# Patient Record
Sex: MALE | Race: WHITE | NOT HISPANIC OR LATINO | Employment: OTHER | URBAN - METROPOLITAN AREA
[De-identification: names, ages, dates, MRNs, and addresses within clinical notes are randomized per-mention and may not be internally consistent; named-entity substitution may affect disease eponyms.]

---

## 2023-03-01 LAB
ANION GAP IN SER/PLAS: 8 MMOL/L (ref 10–20)
CALCIUM (MG/DL) IN SER/PLAS: 7.8 MG/DL (ref 8.6–10.3)
CARBON DIOXIDE, TOTAL (MMOL/L) IN SER/PLAS: 31 MMOL/L (ref 21–32)
CHLORIDE (MMOL/L) IN SER/PLAS: 103 MMOL/L (ref 98–107)
CREATININE (MG/DL) IN SER/PLAS: 0.66 MG/DL (ref 0.5–1.3)
ERYTHROCYTE DISTRIBUTION WIDTH (RATIO) BY AUTOMATED COUNT: 14.6 % (ref 11.5–14.5)
ERYTHROCYTE MEAN CORPUSCULAR HEMOGLOBIN CONCENTRATION (G/DL) BY AUTOMATED: 33.7 G/DL (ref 32–36)
ERYTHROCYTE MEAN CORPUSCULAR VOLUME (FL) BY AUTOMATED COUNT: 94 FL (ref 80–100)
ERYTHROCYTES (10*6/UL) IN BLOOD BY AUTOMATED COUNT: 3.77 X10E12/L (ref 4.5–5.9)
GFR MALE: >90 ML/MIN/1.73M2
GLUCOSE (MG/DL) IN SER/PLAS: 87 MG/DL (ref 74–99)
HEMATOCRIT (%) IN BLOOD BY AUTOMATED COUNT: 35.3 % (ref 41–52)
HEMOGLOBIN (G/DL) IN BLOOD: 11.9 G/DL (ref 13.5–17.5)
LEUKOCYTES (10*3/UL) IN BLOOD BY AUTOMATED COUNT: 4.7 X10E9/L (ref 4.4–11.3)
PLATELETS (10*3/UL) IN BLOOD AUTOMATED COUNT: 136 X10E9/L (ref 150–450)
POTASSIUM (MMOL/L) IN SER/PLAS: 4.4 MMOL/L (ref 3.5–5.3)
SODIUM (MMOL/L) IN SER/PLAS: 138 MMOL/L (ref 136–145)
UREA NITROGEN (MG/DL) IN SER/PLAS: 12 MG/DL (ref 6–23)

## 2023-03-03 PROBLEM — F33.9 MAJOR DEPRESSION, RECURRENT (CMS-HCC): Status: ACTIVE | Noted: 2023-03-03

## 2023-03-03 PROBLEM — K90.89 BILE SALT-INDUCED DIARRHEA (HHS-HCC): Status: ACTIVE | Noted: 2023-03-03

## 2023-03-03 PROBLEM — M15.9 GENERALIZED OSTEOARTHRITIS OF MULTIPLE SITES: Status: ACTIVE | Noted: 2023-03-03

## 2023-03-03 PROBLEM — Z96.659 HISTORY OF KNEE REPLACEMENT: Status: ACTIVE | Noted: 2023-03-03

## 2023-03-03 PROBLEM — E66.01 MORBID OBESITY (MULTI): Status: ACTIVE | Noted: 2023-03-03

## 2023-03-03 PROBLEM — M48.061 LUMBAR STENOSIS: Status: ACTIVE | Noted: 2023-03-03

## 2023-03-03 PROBLEM — I10 BENIGN HYPERTENSION: Status: ACTIVE | Noted: 2023-03-03

## 2023-03-03 PROBLEM — L20.9 ATOPIC DERMATITIS: Status: ACTIVE | Noted: 2023-03-03

## 2023-03-03 PROBLEM — G47.33 OBSTRUCTIVE SLEEP APNEA: Status: ACTIVE | Noted: 2023-03-03

## 2023-03-03 PROBLEM — E03.9 ADULT HYPOTHYROIDISM: Status: ACTIVE | Noted: 2023-03-03

## 2023-03-03 PROBLEM — Z98.890 H/O COLONOSCOPY WITH POLYPECTOMY: Status: ACTIVE | Noted: 2023-03-03

## 2023-03-03 PROBLEM — M17.12 ARTHRITIS OF KNEE, LEFT: Status: ACTIVE | Noted: 2023-03-03

## 2023-03-03 PROBLEM — R10.30 LOWER ABDOMINAL PAIN: Status: ACTIVE | Noted: 2023-03-03

## 2023-03-03 PROBLEM — Z86.0100 H/O COLONOSCOPY WITH POLYPECTOMY: Status: ACTIVE | Noted: 2023-03-03

## 2023-03-03 PROBLEM — D69.6 THROMBOCYTOPENIA (CMS-HCC): Status: ACTIVE | Noted: 2023-03-03

## 2023-03-03 PROBLEM — M47.816 SPONDYLOSIS OF LUMBAR REGION WITHOUT MYELOPATHY OR RADICULOPATHY: Status: ACTIVE | Noted: 2023-03-03

## 2023-03-03 PROBLEM — I95.1 ORTHOSTATIC HYPOTENSION: Status: ACTIVE | Noted: 2023-03-03

## 2023-03-03 PROBLEM — M25.561 RIGHT KNEE PAIN: Status: ACTIVE | Noted: 2023-03-03

## 2023-03-03 PROBLEM — N40.0 BPH (BENIGN PROSTATIC HYPERPLASIA): Status: ACTIVE | Noted: 2023-03-03

## 2023-03-03 PROBLEM — R74.8 ELEVATED LIVER ENZYMES: Status: ACTIVE | Noted: 2023-03-03

## 2023-03-03 PROBLEM — R60.0 EDEMA LEG: Status: ACTIVE | Noted: 2023-03-03

## 2023-03-03 PROBLEM — I48.91 ATRIAL FIBRILLATION (MULTI): Status: ACTIVE | Noted: 2023-03-03

## 2023-03-03 PROBLEM — I87.393 CHRONIC VENOUS HYPERTENSION (IDIOPATHIC) WITH OTHER COMPLICATIONS OF BILATERAL LOWER EXTREMITY: Status: ACTIVE | Noted: 2023-03-03

## 2023-03-03 PROBLEM — I89.0 LYMPHEDEMA: Status: ACTIVE | Noted: 2023-03-03

## 2023-03-03 PROBLEM — R53.1 WEAKNESS GENERALIZED: Status: ACTIVE | Noted: 2023-03-03

## 2023-03-03 PROBLEM — K40.90 INGUINAL HERNIA: Status: ACTIVE | Noted: 2023-03-03

## 2023-03-03 PROBLEM — D47.3 ESSENTIAL THROMBOCYTHEMIA (MULTI): Status: ACTIVE | Noted: 2023-03-03

## 2023-03-03 PROBLEM — I25.10 CAD (CORONARY ARTERY DISEASE): Status: ACTIVE | Noted: 2023-03-03

## 2023-03-03 PROBLEM — Z86.010 H/O COLONOSCOPY WITH POLYPECTOMY: Status: ACTIVE | Noted: 2023-03-03

## 2023-03-03 PROBLEM — M16.11 PRIMARY LOCALIZED OSTEOARTHROSIS OF RIGHT HIP: Status: ACTIVE | Noted: 2023-03-03

## 2023-03-03 PROBLEM — M17.11 ARTHRITIS OF KNEE, RIGHT: Status: ACTIVE | Noted: 2023-03-03

## 2023-03-03 RX ORDER — NALOXONE HYDROCHLORIDE 4 MG/.1ML
SPRAY NASAL
COMMUNITY

## 2023-03-03 RX ORDER — DOXAZOSIN 1 MG/1
1 TABLET ORAL DAILY PRN
COMMUNITY
Start: 2017-08-01 | End: 2023-08-24

## 2023-03-03 RX ORDER — FINASTERIDE 1 MG/1
1 TABLET, FILM COATED ORAL DAILY
COMMUNITY
Start: 2019-10-14 | End: 2023-05-01

## 2023-03-03 RX ORDER — CALCIUM CITRATE/VITAMIN D3 200MG-6.25
TABLET ORAL
COMMUNITY

## 2023-03-03 RX ORDER — VENLAFAXINE HYDROCHLORIDE 150 MG/1
75 CAPSULE, EXTENDED RELEASE ORAL DAILY
COMMUNITY
Start: 2017-10-16 | End: 2023-04-03

## 2023-03-03 RX ORDER — PANTOPRAZOLE SODIUM 40 MG/1
1 TABLET, DELAYED RELEASE ORAL DAILY
COMMUNITY
Start: 2022-08-31 | End: 2023-03-21

## 2023-03-03 RX ORDER — LEVOTHYROXINE SODIUM 50 UG/1
1.5 TABLET ORAL DAILY
COMMUNITY
Start: 2021-10-04 | End: 2023-09-25

## 2023-03-03 RX ORDER — VIT C/E/ZN/COPPR/LUTEIN/ZEAXAN 250MG-90MG
CAPSULE ORAL
COMMUNITY

## 2023-03-03 RX ORDER — WARFARIN SODIUM 5 MG/1
5 TABLET ORAL
COMMUNITY

## 2023-03-03 RX ORDER — TRIAMCINOLONE ACETONIDE 5 MG/G
OINTMENT TOPICAL
COMMUNITY
Start: 2019-04-15

## 2023-03-03 RX ORDER — LANOLIN ALCOHOL/MO/W.PET/CERES
CREAM (GRAM) TOPICAL
COMMUNITY

## 2023-03-03 RX ORDER — AMIODARONE HYDROCHLORIDE 200 MG/1
0.5 TABLET ORAL EVERY OTHER DAY
COMMUNITY

## 2023-03-03 RX ORDER — IBUPROFEN 100 MG/5ML
SUSPENSION, ORAL (FINAL DOSE FORM) ORAL
COMMUNITY

## 2023-03-07 ENCOUNTER — APPOINTMENT (OUTPATIENT)
Dept: LAB | Facility: LAB | Age: 78
End: 2023-03-07
Payer: MEDICARE

## 2023-03-07 ENCOUNTER — OFFICE VISIT (OUTPATIENT)
Dept: PRIMARY CARE | Facility: CLINIC | Age: 78
End: 2023-03-07
Payer: MEDICARE

## 2023-03-07 VITALS
OXYGEN SATURATION: 98 % | DIASTOLIC BLOOD PRESSURE: 67 MMHG | HEIGHT: 70 IN | BODY MASS INDEX: 38.37 KG/M2 | TEMPERATURE: 98.1 F | WEIGHT: 268 LBS | HEART RATE: 66 BPM | SYSTOLIC BLOOD PRESSURE: 102 MMHG

## 2023-03-07 DIAGNOSIS — E66.01 MORBID OBESITY (MULTI): ICD-10-CM

## 2023-03-07 DIAGNOSIS — F33.0 MILD EPISODE OF RECURRENT MAJOR DEPRESSIVE DISORDER (CMS-HCC): ICD-10-CM

## 2023-03-07 DIAGNOSIS — I25.10 CORONARY ARTERY DISEASE INVOLVING NATIVE HEART WITHOUT ANGINA PECTORIS, UNSPECIFIED VESSEL OR LESION TYPE: Primary | ICD-10-CM

## 2023-03-07 DIAGNOSIS — S06.5X0A TRAUMATIC SUBDURAL HEMORRHAGE WITHOUT LOSS OF CONSCIOUSNESS, INITIAL ENCOUNTER (MULTI): ICD-10-CM

## 2023-03-07 DIAGNOSIS — E03.9 ADULT HYPOTHYROIDISM: ICD-10-CM

## 2023-03-07 DIAGNOSIS — G47.33 OBSTRUCTIVE SLEEP APNEA: ICD-10-CM

## 2023-03-07 DIAGNOSIS — M15.9 GENERALIZED OSTEOARTHRITIS OF MULTIPLE SITES: ICD-10-CM

## 2023-03-07 DIAGNOSIS — I10 BENIGN HYPERTENSION: ICD-10-CM

## 2023-03-07 DIAGNOSIS — S80.12XS HEMATOMA OF LEG, LEFT, SEQUELA: ICD-10-CM

## 2023-03-07 DIAGNOSIS — S81.802D OPEN WOUND OF LEFT LOWER LEG, SUBSEQUENT ENCOUNTER: ICD-10-CM

## 2023-03-07 DIAGNOSIS — I48.11 LONGSTANDING PERSISTENT ATRIAL FIBRILLATION (MULTI): ICD-10-CM

## 2023-03-07 LAB
ALANINE AMINOTRANSFERASE (SGPT) (U/L) IN SER/PLAS: 14 U/L (ref 10–52)
ALBUMIN (G/DL) IN SER/PLAS: 3.8 G/DL (ref 3.4–5)
ALKALINE PHOSPHATASE (U/L) IN SER/PLAS: 55 U/L (ref 33–136)
ANION GAP IN SER/PLAS: 11 MMOL/L (ref 10–20)
ASPARTATE AMINOTRANSFERASE (SGOT) (U/L) IN SER/PLAS: 18 U/L (ref 9–39)
BILIRUBIN TOTAL (MG/DL) IN SER/PLAS: 0.9 MG/DL (ref 0–1.2)
CALCIUM (MG/DL) IN SER/PLAS: 8.5 MG/DL (ref 8.6–10.3)
CARBON DIOXIDE, TOTAL (MMOL/L) IN SER/PLAS: 31 MMOL/L (ref 21–32)
CHLORIDE (MMOL/L) IN SER/PLAS: 98 MMOL/L (ref 98–107)
CREATININE (MG/DL) IN SER/PLAS: 0.77 MG/DL (ref 0.5–1.3)
ERYTHROCYTE DISTRIBUTION WIDTH (RATIO) BY AUTOMATED COUNT: 14 % (ref 11.5–14.5)
ERYTHROCYTE MEAN CORPUSCULAR HEMOGLOBIN CONCENTRATION (G/DL) BY AUTOMATED: 32.9 G/DL (ref 32–36)
ERYTHROCYTE MEAN CORPUSCULAR VOLUME (FL) BY AUTOMATED COUNT: 94 FL (ref 80–100)
ERYTHROCYTES (10*6/UL) IN BLOOD BY AUTOMATED COUNT: 4.18 X10E12/L (ref 4.5–5.9)
GFR MALE: >90 ML/MIN/1.73M2
GLUCOSE (MG/DL) IN SER/PLAS: 89 MG/DL (ref 74–99)
HEMATOCRIT (%) IN BLOOD BY AUTOMATED COUNT: 39.2 % (ref 41–52)
HEMOGLOBIN (G/DL) IN BLOOD: 12.9 G/DL (ref 13.5–17.5)
LEUKOCYTES (10*3/UL) IN BLOOD BY AUTOMATED COUNT: 5.6 X10E9/L (ref 4.4–11.3)
PLATELETS (10*3/UL) IN BLOOD AUTOMATED COUNT: 280 X10E9/L (ref 150–450)
POTASSIUM (MMOL/L) IN SER/PLAS: 4.6 MMOL/L (ref 3.5–5.3)
PROTEIN TOTAL: 6.4 G/DL (ref 6.4–8.2)
SODIUM (MMOL/L) IN SER/PLAS: 135 MMOL/L (ref 136–145)
UREA NITROGEN (MG/DL) IN SER/PLAS: 17 MG/DL (ref 6–23)

## 2023-03-07 PROCEDURE — 1036F TOBACCO NON-USER: CPT | Performed by: FAMILY MEDICINE

## 2023-03-07 PROCEDURE — 3074F SYST BP LT 130 MM HG: CPT | Performed by: FAMILY MEDICINE

## 2023-03-07 PROCEDURE — 3078F DIAST BP <80 MM HG: CPT | Performed by: FAMILY MEDICINE

## 2023-03-07 PROCEDURE — 1159F MED LIST DOCD IN RCRD: CPT | Performed by: FAMILY MEDICINE

## 2023-03-07 PROCEDURE — 85610 PROTHROMBIN TIME: CPT

## 2023-03-07 PROCEDURE — 99496 TRANSJ CARE MGMT HIGH F2F 7D: CPT | Performed by: FAMILY MEDICINE

## 2023-03-07 PROCEDURE — 80053 COMPREHEN METABOLIC PANEL: CPT

## 2023-03-07 PROCEDURE — 36415 COLL VENOUS BLD VENIPUNCTURE: CPT

## 2023-03-07 PROCEDURE — 85027 COMPLETE CBC AUTOMATED: CPT

## 2023-03-07 ASSESSMENT — ENCOUNTER SYMPTOMS
CHEST TIGHTNESS: 0
SHORTNESS OF BREATH: 0
PALPITATIONS: 0

## 2023-03-07 NOTE — PATIENT INSTRUCTIONS
Continue with physical therapy, Occupational Therapy as ordered     Continue with wound therapy     Coumadin: recommend stop for 1-2 weeks to let the wound heal   I will let Dr Moore know     Constipation stool softeners ,  fiber supplements ,  miralax as needed     Tylenol as needed ice as needed, narcotics as needed     Continue with blood thinners, watch for bleeding, bruising    Atrial Fibrillation, coronary artery disease: Continue management per cardiology    Hypothyroidism: Continue current medications atrial fibrillation,
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
## 2023-03-08 ENCOUNTER — TELEPHONE (OUTPATIENT)
Dept: PRIMARY CARE | Facility: CLINIC | Age: 78
End: 2023-03-08
Payer: MEDICARE

## 2023-03-08 NOTE — TELEPHONE ENCOUNTER
Result Communication    Resulted Orders   CBC   Result Value Ref Range    WBC 5.6 4.4 - 11.3 x10E9/L    RBC 4.18 (L) 4.50 - 5.90 x10E12/L    Hemoglobin 12.9 (L) 13.5 - 17.5 g/dL    Hematocrit 39.2 (L) 41.0 - 52.0 %    MCV 94 80 - 100 fL    MCHC 32.9 32.0 - 36.0 g/dL    Platelets 280 150 - 450 x10E9/L    RDW 14.0 11.5 - 14.5 %   Comprehensive Metabolic Panel   Result Value Ref Range    Glucose 89 74 - 99 mg/dL    Sodium 135 (L) 136 - 145 mmol/L    Potassium 4.6 3.5 - 5.3 mmol/L    Chloride 98 98 - 107 mmol/L    Bicarbonate 31 21 - 32 mmol/L    Anion Gap 11 10 - 20 mmol/L    Urea Nitrogen 17 6 - 23 mg/dL    Creatinine 0.77 0.50 - 1.30 mg/dL    GFR MALE >90 >90 mL/min/1.73m2      Comment:       CALCULATIONS OF ESTIMATED GFR ARE PERFORMED   USING THE 2021 CKD-EPI STUDY REFIT EQUATION   WITHOUT THE RACE VARIABLE FOR THE IDMS-TRACEABLE   CREATININE METHODS.    https://jasn.asnjournals.org/content/early/2021/09/22/ASN.8437443688    Calcium 8.5 (L) 8.6 - 10.3 mg/dL    Albumin 3.8 3.4 - 5.0 g/dL    Alkaline Phosphatase 55 33 - 136 U/L    Total Protein 6.4 6.4 - 8.2 g/dL    AST 18 9 - 39 U/L    Total Bilirubin 0.9 0.0 - 1.2 mg/dL    ALT (SGPT) 14 10 - 52 U/L      Comment:       Patients treated with Sulfasalazine may generate    falsely decreased results for ALT.       2:31 PM      LVM.  CA

## 2023-03-08 NOTE — TELEPHONE ENCOUNTER
----- Message from Ruth Lowery MD sent at 3/8/2023  8:07 AM EST -----  Please notify pt of lab results: Generally looks okay mild anemia but stable, electrolytes stable

## 2023-03-09 ENCOUNTER — TELEPHONE (OUTPATIENT)
Dept: PRIMARY CARE | Facility: CLINIC | Age: 78
End: 2023-03-09
Payer: MEDICARE

## 2023-03-09 LAB
INR IN PPP BY COAGULATION ASSAY: 1.8 (ref 0.9–1.1)
PROTHROMBIN TIME (PT) IN PPP BY COAGULATION ASSAY: 20.6 SEC (ref 9.8–13.4)

## 2023-03-09 NOTE — TELEPHONE ENCOUNTER
----- Message from Ruth Lowery MD sent at 3/9/2023  1:54 PM EST -----  Please notify pt of lab results a\, inr and electrolytes were okay

## 2023-03-09 NOTE — TELEPHONE ENCOUNTER
Result Communication    Resulted Orders   CBC   Result Value Ref Range    WBC 5.6 4.4 - 11.3 x10E9/L    RBC 4.18 (L) 4.50 - 5.90 x10E12/L    Hemoglobin 12.9 (L) 13.5 - 17.5 g/dL    Hematocrit 39.2 (L) 41.0 - 52.0 %    MCV 94 80 - 100 fL    MCHC 32.9 32.0 - 36.0 g/dL    Platelets 280 150 - 450 x10E9/L    RDW 14.0 11.5 - 14.5 %   Comprehensive Metabolic Panel   Result Value Ref Range    Glucose 89 74 - 99 mg/dL    Sodium 135 (L) 136 - 145 mmol/L    Potassium 4.6 3.5 - 5.3 mmol/L    Chloride 98 98 - 107 mmol/L    Bicarbonate 31 21 - 32 mmol/L    Anion Gap 11 10 - 20 mmol/L    Urea Nitrogen 17 6 - 23 mg/dL    Creatinine 0.77 0.50 - 1.30 mg/dL    GFR MALE >90 >90 mL/min/1.73m2      Comment:       CALCULATIONS OF ESTIMATED GFR ARE PERFORMED   USING THE 2021 CKD-EPI STUDY REFIT EQUATION   WITHOUT THE RACE VARIABLE FOR THE IDMS-TRACEABLE   CREATININE METHODS.    https://jasn.asnjournals.org/content/early/2021/09/22/ASN.3668348029    Calcium 8.5 (L) 8.6 - 10.3 mg/dL    Albumin 3.8 3.4 - 5.0 g/dL    Alkaline Phosphatase 55 33 - 136 U/L    Total Protein 6.4 6.4 - 8.2 g/dL    AST 18 9 - 39 U/L    Total Bilirubin 0.9 0.0 - 1.2 mg/dL    ALT (SGPT) 14 10 - 52 U/L      Comment:       Patients treated with Sulfasalazine may generate    falsely decreased results for ALT.   Protime-INR   Result Value Ref Range    Protime 20.6 (H) 9.8 - 13.4 sec    INR 1.8 (H) 0.9 - 1.1       2:25 PM      Results were successfully communicated with the patient and they acknowledged their understanding.

## 2023-03-20 DIAGNOSIS — K90.89 BILE SALT-INDUCED DIARRHEA (HHS-HCC): Primary | ICD-10-CM

## 2023-03-20 RX ORDER — HYDROCODONE BITARTRATE AND ACETAMINOPHEN 5; 325 MG/1; MG/1
TABLET ORAL
COMMUNITY
Start: 2022-10-18 | End: 2023-12-12 | Stop reason: WASHOUT

## 2023-03-20 RX ORDER — SOTALOL HYDROCHLORIDE 80 MG/1
80 TABLET ORAL EVERY 12 HOURS SCHEDULED
COMMUNITY
Start: 2023-01-16

## 2023-03-20 RX ORDER — HYDROCODONE BITARTRATE AND ACETAMINOPHEN 7.5; 325 MG/1; MG/1
1 TABLET ORAL 2 TIMES DAILY
COMMUNITY
Start: 2022-12-29 | End: 2023-12-12 | Stop reason: SDUPTHER

## 2023-03-20 RX ORDER — DICYCLOMINE HYDROCHLORIDE 20 MG/1
TABLET ORAL
Qty: 90 TABLET | Refills: 0 | Status: SHIPPED | OUTPATIENT
Start: 2023-03-20 | End: 2023-06-22

## 2023-03-20 RX ORDER — DOXYCYCLINE HYCLATE 100 MG
1 TABLET ORAL 2 TIMES DAILY
COMMUNITY
Start: 2023-03-08 | End: 2024-01-04 | Stop reason: ALTCHOICE

## 2023-03-20 RX ORDER — PERPHENAZINE 2 MG
TABLET ORAL
COMMUNITY

## 2023-03-20 RX ORDER — MULTIVIT-MIN/IRON FUM/FOLIC AC 7.5 MG-4
TABLET ORAL
COMMUNITY

## 2023-03-20 RX ORDER — DICYCLOMINE HYDROCHLORIDE 10 MG/1
1 CAPSULE ORAL
COMMUNITY
Start: 2022-10-31 | End: 2023-03-20 | Stop reason: SDUPTHER

## 2023-03-21 DIAGNOSIS — K21.9 GASTROESOPHAGEAL REFLUX DISEASE WITHOUT ESOPHAGITIS: Primary | ICD-10-CM

## 2023-03-21 RX ORDER — PANTOPRAZOLE SODIUM 40 MG/1
TABLET, DELAYED RELEASE ORAL
Qty: 90 TABLET | Refills: 3 | Status: SHIPPED | OUTPATIENT
Start: 2023-03-21 | End: 2024-03-11

## 2023-04-02 DIAGNOSIS — F33.9 RECURRENT MAJOR DEPRESSIVE DISORDER, REMISSION STATUS UNSPECIFIED (CMS-HCC): ICD-10-CM

## 2023-04-03 RX ORDER — VENLAFAXINE HYDROCHLORIDE 150 MG/1
150 CAPSULE, EXTENDED RELEASE ORAL DAILY
Qty: 90 CAPSULE | Refills: 1 | Status: SHIPPED | OUTPATIENT
Start: 2023-04-03 | End: 2023-11-17

## 2023-05-01 DIAGNOSIS — R35.0 BENIGN PROSTATIC HYPERPLASIA WITH URINARY FREQUENCY: Primary | ICD-10-CM

## 2023-05-01 DIAGNOSIS — N40.1 BENIGN PROSTATIC HYPERPLASIA WITH URINARY FREQUENCY: Primary | ICD-10-CM

## 2023-05-01 PROBLEM — M51.16 DISPLACEMENT OF LUMBAR DISC WITH RADICULOPATHY: Status: ACTIVE | Noted: 2023-05-01

## 2023-05-01 PROBLEM — K29.00 ACUTE GASTRITIS: Status: RESOLVED | Noted: 2023-05-01 | Resolved: 2023-05-01

## 2023-05-01 PROBLEM — M25.559 HIP PAIN, ACUTE: Status: RESOLVED | Noted: 2023-05-01 | Resolved: 2023-05-01

## 2023-05-01 PROBLEM — M48.062 NEUROGENIC CLAUDICATION DUE TO LUMBAR SPINAL STENOSIS: Status: ACTIVE | Noted: 2023-05-01

## 2023-05-01 PROBLEM — F11.90 CHRONIC, CONTINUOUS USE OF OPIOIDS: Status: ACTIVE | Noted: 2023-05-01

## 2023-05-01 RX ORDER — ALBUTEROL SULFATE 90 UG/1
2 AEROSOL, METERED RESPIRATORY (INHALATION) EVERY 6 HOURS PRN
COMMUNITY
Start: 2022-08-17

## 2023-05-01 RX ORDER — FINASTERIDE 1 MG/1
1 TABLET, FILM COATED ORAL DAILY
Qty: 90 TABLET | Refills: 1 | Status: SHIPPED | OUTPATIENT
Start: 2023-05-01 | End: 2023-11-17

## 2023-06-22 DIAGNOSIS — K90.89 BILE SALT-INDUCED DIARRHEA (HHS-HCC): ICD-10-CM

## 2023-06-22 RX ORDER — DICYCLOMINE HYDROCHLORIDE 20 MG/1
TABLET ORAL
Qty: 90 TABLET | Refills: 2 | Status: SHIPPED | OUTPATIENT
Start: 2023-06-22 | End: 2024-03-23

## 2023-08-03 ENCOUNTER — HOSPITAL ENCOUNTER (OUTPATIENT)
Dept: DATA CONVERSION | Facility: HOSPITAL | Age: 78
End: 2023-08-03
Attending: ANESTHESIOLOGY | Admitting: ANESTHESIOLOGY
Payer: MEDICARE

## 2023-08-03 DIAGNOSIS — M54.16 RADICULOPATHY, LUMBAR REGION: ICD-10-CM

## 2023-08-03 DIAGNOSIS — M51.16 INTERVERTEBRAL DISC DISORDERS WITH RADICULOPATHY, LUMBAR REGION: ICD-10-CM

## 2023-08-03 LAB — POCT INTERNATIONAL NORMALIZATION RATIO: 1.2 (ref 0.9–1.1)

## 2023-08-23 DIAGNOSIS — I10 BENIGN HYPERTENSION: Primary | ICD-10-CM

## 2023-08-24 PROBLEM — M54.16 BILATERAL LUMBAR RADICULOPATHY: Status: ACTIVE | Noted: 2023-08-24

## 2023-08-24 RX ORDER — DOXAZOSIN 1 MG/1
1 TABLET ORAL DAILY
Qty: 90 TABLET | Refills: 0 | Status: SHIPPED | OUTPATIENT
Start: 2023-08-24 | End: 2023-11-15

## 2023-09-25 DIAGNOSIS — E03.9 ADULT HYPOTHYROIDISM: Primary | ICD-10-CM

## 2023-09-25 RX ORDER — LEVOTHYROXINE SODIUM 50 UG/1
50 TABLET ORAL DAILY
Qty: 90 TABLET | Refills: 1 | Status: SHIPPED | OUTPATIENT
Start: 2023-09-25 | End: 2024-01-09

## 2023-10-01 NOTE — OP NOTE
Post Operative Note:     PreOp Diagnosis: Lumbar disc disease with radiculopathy   Post-Procedure Diagnosis: Lumbar disc disease with  radiculopathy   Procedure: 1.  L5-S1 interlaminar epidural steroid  injection  2.   3.   4.   5.   Surgeon: Carlos Alberto Willams   Resident/Fellow/Other Assistant: Donald Osorio   Estimated Blood Loss (mL): none   Specimen: no   Findings: N/A     Operative Report Dictated:  Dictation: not applicable - note contains Operative  Report   Operative Report:    After informed consent was obtained, the patient was brought to the operating room and placed in the prone position.  The back area was prepped and draped in usual  sterile fashion.  Using fluoroscopic guidance, the skin and subcutaneous tissue overlying needle trajectory to the below interlaminar epidural space were anesthetized with a total of 5 mL of 0.5% lidocaine in the below paraspinous approach.  An 18-gauge  Tuohy needle was then advanced under fluoroscopic guidance with the below paraspinous approach into the below  interlaminar epidural space at L5-S1. Entry of the epidural space was confirmed using loss of resistance technique with a glass syringe and  2 mL of air.  Injection of Iohexol contrast revealed appropriate spread without vascular uptake. Subsequently, the below medications were injected.  The needle was removed.  The patient tolerated the procedure well.  The patient was then transferred to  recovery room in stable condition and will update us on his/her response, follow up with us on outpatient basis as needed.     Level: [L5-S1]  Medications [4 mL of 0.5% lidocaine and 40 mg methylprednisolone]    Attestation:   Note Completion:  I am a: Resident/Fellow   Attending Attestation I was present for the entire procedure         Electronic Signatures:  Carlos Alberto Willams)  (Signed 03-Aug-2023 14:17)   Co-Signer: Post Operative Note, Note Completion  Donald Osorio (Fellow))  (Signed 03-Aug-2023  11:35)   Authored: Post Operative Note, Note Completion      Last Updated: 03-Aug-2023 14:17 by Carlos Alberto Willams)

## 2023-10-10 DIAGNOSIS — M48.061 SPINAL STENOSIS OF LUMBAR REGION, UNSPECIFIED WHETHER NEUROGENIC CLAUDICATION PRESENT: ICD-10-CM

## 2023-10-25 NOTE — PROGRESS NOTES
Patient ID: Leonides Diana is a 78 y.o. male.    Transforaminal EPIDURAL STEROID INJECTION    Date/Time: 10/25/2023 8:49 AM    Performed by: Carlos Alberto Willams MD  Authorized by: Carlos Alberto Willams MD    Consent:     Consent obtained:  Written    Consent given by:  Patient    Risks, benefits, and alternatives were discussed: yes      Risks discussed:  Bleeding, infection, pain and nerve damage  Universal protocol:     Patient identity confirmed:  Arm band  Indications:     Indications:  BACK PAIN,LEG PAIN  Pre-procedure details:     Skin preparation:  Chlorhexidine    Preparation: Patient was prepped and draped in the usual sterile fashion    Sedation:     Sedation type:  None  Anesthesia:     Anesthesia method: LIDOCAINE 0.5%

## 2023-10-26 ENCOUNTER — APPOINTMENT (OUTPATIENT)
Dept: PAIN MEDICINE | Facility: CLINIC | Age: 78
End: 2023-10-26
Payer: MEDICARE

## 2023-11-01 NOTE — PROGRESS NOTES
Patient ID: Leonides Diana is a 78 y.o. male.    Transforaminal    Date/Time: 11/1/2023 8:07 AM    Performed by: Carlos Alberto Willams MD  Authorized by: Carlos Alberto Willams MD    Consent:     Consent obtained:  Written    Consent given by:  Patient    Risks, benefits, and alternatives were discussed: yes      Risks discussed:  Bleeding, infection, pain and nerve damage  Universal protocol:     Procedure explained and questions answered to patient or proxy's satisfaction: yes      Relevant documents present and verified: yes      Test results available: yes      Imaging studies available: yes      Required blood products, implants, devices, and special equipment available: yes      Site/side marked: yes      Immediately prior to procedure, a time out was called: yes      Patient identity confirmed:  Arm band  Indications:     Indications:  BACK PAIN,LEG PAIN  Pre-procedure details:     Skin preparation:  Chlorhexidine    Preparation: Patient was prepped and draped in the usual sterile fashion    Sedation:     Sedation type:  None  Anesthesia:     Anesthesia method:  Local infiltration    Local anesthetic:  Sodium bicarbonate (LIDOCAINE 0.5%)
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
## 2023-11-02 ENCOUNTER — APPOINTMENT (OUTPATIENT)
Dept: PAIN MEDICINE | Facility: CLINIC | Age: 78
End: 2023-11-02
Payer: MEDICARE

## 2023-11-07 NOTE — PROGRESS NOTES
Patient ID: Leonides Diana is a 78 y.o. male.    Transforaminal    Date/Time: 11/7/2023 9:04 AM    Performed by: Carlos Alberto Willams MD  Authorized by: Carlos Alberto Willams MD    Consent:     Consent obtained:  Written    Consent given by:  Patient    Risks, benefits, and alternatives were discussed: yes      Risks discussed:  Bleeding, infection, pain and nerve damage  Universal protocol:     Procedure explained and questions answered to patient or proxy's satisfaction: yes      Relevant documents present and verified: yes      Test results available: yes      Imaging studies available: yes      Required blood products, implants, devices, and special equipment available: yes      Site/side marked: yes      Immediately prior to procedure, a time out was called: yes      Patient identity confirmed:  Arm band  Indications:     Indications:  BACK PAIN,LEG PAIN  Pre-procedure details:     Skin preparation:  Chlorhexidine    Preparation: Patient was prepped and draped in the usual sterile fashion    Sedation:     Sedation type:  None  Anesthesia:     Anesthesia method:  Local infiltration    Local anesthetic: LIDOCAINE 0.5%  Procedure specific details:      History reviewed patient interviewed and examined.  Risks and benefits of procedures discussed and patient agreed to proceed and consent was signed.  With the patient in the prone position the lower back was prepped and draped in sterile fashion.  Under AP guidance L5-S1 vertebral bodies were identified.  In aright lateral oblique view the L5 foramen was identified and used as a trajectory view.  A 22-gauge 5 spinal needle was introduced into L5 foramen.  Position was identified in AP and lateral.  IV contrast showed adequate foraminal spread without any vascular or intrathecal uptake.  Lidocaine 0.5% mixed with dexamethasone 10 mg a total of 1.5 cc was injected in each foramen.  Patient tolerated the procedure without any problems and was transferred to recovery room in  stable condition.     Post-procedure details:     Procedure completion:  Tolerated

## 2023-11-15 DIAGNOSIS — I10 BENIGN HYPERTENSION: ICD-10-CM

## 2023-11-15 RX ORDER — DOXAZOSIN 1 MG/1
1 TABLET ORAL DAILY
Qty: 90 TABLET | Refills: 0 | Status: SHIPPED | OUTPATIENT
Start: 2023-11-15 | End: 2023-11-17

## 2023-11-16 ENCOUNTER — OFFICE VISIT (OUTPATIENT)
Dept: PAIN MEDICINE | Facility: CLINIC | Age: 78
End: 2023-11-16
Payer: MEDICARE

## 2023-11-16 ENCOUNTER — HOSPITAL ENCOUNTER (OUTPATIENT)
Dept: RADIOLOGY | Facility: HOSPITAL | Age: 78
Discharge: HOME | End: 2023-11-16
Payer: MEDICARE

## 2023-11-16 VITALS — SYSTOLIC BLOOD PRESSURE: 117 MMHG | DIASTOLIC BLOOD PRESSURE: 77 MMHG | HEART RATE: 60 BPM | RESPIRATION RATE: 16 BRPM

## 2023-11-16 DIAGNOSIS — I48.91 ATRIAL FIBRILLATION, UNSPECIFIED TYPE (MULTI): ICD-10-CM

## 2023-11-16 DIAGNOSIS — M54.16 BILATERAL LUMBAR RADICULOPATHY: ICD-10-CM

## 2023-11-16 DIAGNOSIS — M47.816 SPONDYLOSIS OF LUMBAR REGION WITHOUT MYELOPATHY OR RADICULOPATHY: ICD-10-CM

## 2023-11-16 PROCEDURE — 2500000004 HC RX 250 GENERAL PHARMACY W/ HCPCS (ALT 636 FOR OP/ED)

## 2023-11-16 PROCEDURE — 2550000001 HC RX 255 CONTRASTS: Performed by: ANESTHESIOLOGY

## 2023-11-16 PROCEDURE — 2500000005 HC RX 250 GENERAL PHARMACY W/O HCPCS

## 2023-11-16 PROCEDURE — 1125F AMNT PAIN NOTED PAIN PRSNT: CPT | Performed by: ANESTHESIOLOGY

## 2023-11-16 PROCEDURE — 77003 FLUOROGUIDE FOR SPINE INJECT: CPT

## 2023-11-16 PROCEDURE — 1036F TOBACCO NON-USER: CPT | Performed by: ANESTHESIOLOGY

## 2023-11-16 PROCEDURE — 64483 NJX AA&/STRD TFRM EPI L/S 1: CPT | Performed by: ANESTHESIOLOGY

## 2023-11-16 PROCEDURE — 3078F DIAST BP <80 MM HG: CPT | Performed by: ANESTHESIOLOGY

## 2023-11-16 PROCEDURE — 3074F SYST BP LT 130 MM HG: CPT | Performed by: ANESTHESIOLOGY

## 2023-11-16 PROCEDURE — 1159F MED LIST DOCD IN RCRD: CPT | Performed by: ANESTHESIOLOGY

## 2023-11-16 RX ORDER — PREGABALIN 25 MG/1
CAPSULE ORAL
Qty: 120 CAPSULE | Refills: 3 | Status: SHIPPED | OUTPATIENT
Start: 2023-11-16

## 2023-11-16 RX ADMIN — IOHEXOL 5 ML: 240 INJECTION, SOLUTION INTRATHECAL; INTRAVASCULAR; INTRAVENOUS; ORAL at 11:01

## 2023-11-16 ASSESSMENT — PAIN DESCRIPTION - DESCRIPTORS: DESCRIPTORS: RADIATING

## 2023-11-16 ASSESSMENT — PAIN SCALES - GENERAL
PAINLEVEL_OUTOF10: 4
PAINLEVEL_OUTOF10: 4

## 2023-11-16 ASSESSMENT — ENCOUNTER SYMPTOMS
DEPRESSION: 0
OCCASIONAL FEELINGS OF UNSTEADINESS: 0
LOSS OF SENSATION IN FEET: 0

## 2023-11-16 ASSESSMENT — PAIN - FUNCTIONAL ASSESSMENT: PAIN_FUNCTIONAL_ASSESSMENT: 0-10
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
## 2023-11-17 DIAGNOSIS — I10 BENIGN HYPERTENSION: ICD-10-CM

## 2023-11-17 DIAGNOSIS — F33.9 RECURRENT MAJOR DEPRESSIVE DISORDER, REMISSION STATUS UNSPECIFIED (CMS-HCC): ICD-10-CM

## 2023-11-17 DIAGNOSIS — R35.0 BENIGN PROSTATIC HYPERPLASIA WITH URINARY FREQUENCY: ICD-10-CM

## 2023-11-17 DIAGNOSIS — N40.1 BENIGN PROSTATIC HYPERPLASIA WITH URINARY FREQUENCY: ICD-10-CM

## 2023-11-17 RX ORDER — FINASTERIDE 1 MG/1
1 TABLET, FILM COATED ORAL DAILY
Qty: 90 TABLET | Refills: 0 | Status: SHIPPED | OUTPATIENT
Start: 2023-11-17 | End: 2024-02-19

## 2023-11-17 RX ORDER — VENLAFAXINE HYDROCHLORIDE 150 MG/1
150 CAPSULE, EXTENDED RELEASE ORAL DAILY
Qty: 90 CAPSULE | Refills: 0 | Status: SHIPPED | OUTPATIENT
Start: 2023-11-17 | End: 2024-02-19

## 2023-11-17 RX ORDER — DOXAZOSIN 1 MG/1
1 TABLET ORAL DAILY
Qty: 90 TABLET | Refills: 0 | Status: SHIPPED | OUTPATIENT
Start: 2023-11-17 | End: 2024-05-03

## 2023-12-12 DIAGNOSIS — M54.16 BILATERAL LUMBAR RADICULOPATHY: ICD-10-CM

## 2023-12-12 DIAGNOSIS — M15.9 GENERALIZED OSTEOARTHRITIS OF MULTIPLE SITES: ICD-10-CM

## 2023-12-12 DIAGNOSIS — M47.816 SPONDYLOSIS OF LUMBAR REGION WITHOUT MYELOPATHY OR RADICULOPATHY: ICD-10-CM

## 2023-12-12 RX ORDER — HYDROCODONE BITARTRATE AND ACETAMINOPHEN 7.5; 325 MG/1; MG/1
1 TABLET ORAL 2 TIMES DAILY
Qty: 60 TABLET | Refills: 0 | Status: SHIPPED | OUTPATIENT
Start: 2023-12-12 | End: 2024-02-16 | Stop reason: SDUPTHER

## 2023-12-27 ENCOUNTER — HOSPITAL ENCOUNTER (EMERGENCY)
Facility: HOSPITAL | Age: 78
Discharge: HOME | End: 2023-12-27
Payer: MEDICARE

## 2023-12-27 ENCOUNTER — APPOINTMENT (OUTPATIENT)
Dept: RADIOLOGY | Facility: HOSPITAL | Age: 78
End: 2023-12-27
Payer: MEDICARE

## 2023-12-27 VITALS
RESPIRATION RATE: 17 BRPM | OXYGEN SATURATION: 96 % | HEART RATE: 67 BPM | TEMPERATURE: 97.7 F | DIASTOLIC BLOOD PRESSURE: 77 MMHG | HEIGHT: 71 IN | WEIGHT: 250 LBS | BODY MASS INDEX: 35 KG/M2 | SYSTOLIC BLOOD PRESSURE: 144 MMHG

## 2023-12-27 DIAGNOSIS — S70.12XA TRAUMATIC ECCHYMOSIS OF LEFT THIGH, INITIAL ENCOUNTER: Primary | ICD-10-CM

## 2023-12-27 LAB
ALBUMIN SERPL BCP-MCNC: 3.7 G/DL (ref 3.4–5)
ALP SERPL-CCNC: 64 U/L (ref 33–136)
ALT SERPL W P-5'-P-CCNC: 13 U/L (ref 10–52)
ANION GAP SERPL CALC-SCNC: 9 MMOL/L (ref 10–20)
APTT PPP: 42 SECONDS (ref 27–38)
AST SERPL W P-5'-P-CCNC: 20 U/L (ref 9–39)
BASOPHILS # BLD AUTO: 0.02 X10*3/UL (ref 0–0.1)
BASOPHILS NFR BLD AUTO: 0.4 %
BILIRUB SERPL-MCNC: 0.6 MG/DL (ref 0–1.2)
BUN SERPL-MCNC: 20 MG/DL (ref 6–23)
CALCIUM SERPL-MCNC: 8.7 MG/DL (ref 8.6–10.3)
CHLORIDE SERPL-SCNC: 107 MMOL/L (ref 98–107)
CO2 SERPL-SCNC: 30 MMOL/L (ref 21–32)
CREAT SERPL-MCNC: 0.78 MG/DL (ref 0.5–1.3)
EOSINOPHIL # BLD AUTO: 0.14 X10*3/UL (ref 0–0.4)
EOSINOPHIL NFR BLD AUTO: 3.1 %
ERYTHROCYTE [DISTWIDTH] IN BLOOD BY AUTOMATED COUNT: 13.7 % (ref 11.5–14.5)
GFR SERPL CREATININE-BSD FRML MDRD: >90 ML/MIN/1.73M*2
GLUCOSE SERPL-MCNC: 86 MG/DL (ref 74–99)
HCT VFR BLD AUTO: 40.3 % (ref 41–52)
HGB BLD-MCNC: 13.6 G/DL (ref 13.5–17.5)
IMM GRANULOCYTES # BLD AUTO: 0.01 X10*3/UL (ref 0–0.5)
IMM GRANULOCYTES NFR BLD AUTO: 0.2 % (ref 0–0.9)
INR PPP: 2.7 (ref 0.9–1.1)
LYMPHOCYTES # BLD AUTO: 1.54 X10*3/UL (ref 0.8–3)
LYMPHOCYTES NFR BLD AUTO: 34.3 %
MCH RBC QN AUTO: 31.9 PG (ref 26–34)
MCHC RBC AUTO-ENTMCNC: 33.7 G/DL (ref 32–36)
MCV RBC AUTO: 95 FL (ref 80–100)
MONOCYTES # BLD AUTO: 0.45 X10*3/UL (ref 0.05–0.8)
MONOCYTES NFR BLD AUTO: 10 %
NEUTROPHILS # BLD AUTO: 2.33 X10*3/UL (ref 1.6–5.5)
NEUTROPHILS NFR BLD AUTO: 52 %
NRBC BLD-RTO: 0 /100 WBCS (ref 0–0)
PLATELET # BLD AUTO: 143 X10*3/UL (ref 150–450)
POTASSIUM SERPL-SCNC: 4 MMOL/L (ref 3.5–5.3)
PROT SERPL-MCNC: 6 G/DL (ref 6.4–8.2)
PROTHROMBIN TIME: 30.8 SECONDS (ref 9.8–12.8)
RBC # BLD AUTO: 4.26 X10*6/UL (ref 4.5–5.9)
SODIUM SERPL-SCNC: 142 MMOL/L (ref 136–145)
WBC # BLD AUTO: 4.5 X10*3/UL (ref 4.4–11.3)

## 2023-12-27 PROCEDURE — 85025 COMPLETE CBC W/AUTO DIFF WBC: CPT | Performed by: PHYSICIAN ASSISTANT

## 2023-12-27 PROCEDURE — 85730 THROMBOPLASTIN TIME PARTIAL: CPT | Performed by: PHYSICIAN ASSISTANT

## 2023-12-27 PROCEDURE — 75635 CT ANGIO ABDOMINAL ARTERIES: CPT

## 2023-12-27 PROCEDURE — 85610 PROTHROMBIN TIME: CPT | Performed by: PHYSICIAN ASSISTANT

## 2023-12-27 PROCEDURE — 75635 CT ANGIO ABDOMINAL ARTERIES: CPT | Performed by: RADIOLOGY

## 2023-12-27 PROCEDURE — 80053 COMPREHEN METABOLIC PANEL: CPT | Performed by: PHYSICIAN ASSISTANT

## 2023-12-27 PROCEDURE — 2550000001 HC RX 255 CONTRASTS: Performed by: PHYSICIAN ASSISTANT

## 2023-12-27 PROCEDURE — 36415 COLL VENOUS BLD VENIPUNCTURE: CPT | Performed by: PHYSICIAN ASSISTANT

## 2023-12-27 PROCEDURE — 99284 EMERGENCY DEPT VISIT MOD MDM: CPT

## 2023-12-27 RX ADMIN — IOHEXOL 90 ML: 350 INJECTION, SOLUTION INTRAVENOUS at 18:46

## 2023-12-27 ASSESSMENT — COLUMBIA-SUICIDE SEVERITY RATING SCALE - C-SSRS
1. IN THE PAST MONTH, HAVE YOU WISHED YOU WERE DEAD OR WISHED YOU COULD GO TO SLEEP AND NOT WAKE UP?: NO
6. HAVE YOU EVER DONE ANYTHING, STARTED TO DO ANYTHING, OR PREPARED TO DO ANYTHING TO END YOUR LIFE?: NO
2. HAVE YOU ACTUALLY HAD ANY THOUGHTS OF KILLING YOURSELF?: NO

## 2023-12-27 ASSESSMENT — LIFESTYLE VARIABLES
EVER HAD A DRINK FIRST THING IN THE MORNING TO STEADY YOUR NERVES TO GET RID OF A HANGOVER: NO
REASON UNABLE TO ASSESS: NO
HAVE YOU EVER FELT YOU SHOULD CUT DOWN ON YOUR DRINKING: NO
HAVE PEOPLE ANNOYED YOU BY CRITICIZING YOUR DRINKING: NO
EVER FELT BAD OR GUILTY ABOUT YOUR DRINKING: NO
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
## 2024-01-04 ENCOUNTER — OFFICE VISIT (OUTPATIENT)
Dept: PRIMARY CARE | Facility: CLINIC | Age: 79
End: 2024-01-04
Payer: MEDICARE

## 2024-01-04 VITALS
TEMPERATURE: 98.4 F | DIASTOLIC BLOOD PRESSURE: 67 MMHG | HEART RATE: 66 BPM | SYSTOLIC BLOOD PRESSURE: 100 MMHG | BODY MASS INDEX: 36.68 KG/M2 | HEIGHT: 71 IN | OXYGEN SATURATION: 95 % | WEIGHT: 262 LBS

## 2024-01-04 DIAGNOSIS — N40.0 BENIGN PROSTATIC HYPERPLASIA WITHOUT LOWER URINARY TRACT SYMPTOMS: ICD-10-CM

## 2024-01-04 DIAGNOSIS — G47.33 OBSTRUCTIVE SLEEP APNEA: ICD-10-CM

## 2024-01-04 DIAGNOSIS — Z23 NEED FOR INFLUENZA VACCINATION: ICD-10-CM

## 2024-01-04 DIAGNOSIS — D47.3 ESSENTIAL THROMBOCYTHEMIA (MULTI): ICD-10-CM

## 2024-01-04 DIAGNOSIS — Z13.820 SCREENING FOR OSTEOPOROSIS: ICD-10-CM

## 2024-01-04 DIAGNOSIS — I25.10 CORONARY ARTERY DISEASE INVOLVING NATIVE HEART WITHOUT ANGINA PECTORIS, UNSPECIFIED VESSEL OR LESION TYPE: ICD-10-CM

## 2024-01-04 DIAGNOSIS — M25.551 PAIN OF RIGHT HIP: ICD-10-CM

## 2024-01-04 DIAGNOSIS — F33.0 MILD EPISODE OF RECURRENT MAJOR DEPRESSIVE DISORDER (CMS-HCC): ICD-10-CM

## 2024-01-04 DIAGNOSIS — E66.01 MORBID OBESITY (MULTI): ICD-10-CM

## 2024-01-04 DIAGNOSIS — S70.02XD CONTUSION OF LEFT HIP, SUBSEQUENT ENCOUNTER: ICD-10-CM

## 2024-01-04 DIAGNOSIS — I48.11 LONGSTANDING PERSISTENT ATRIAL FIBRILLATION (MULTI): Primary | ICD-10-CM

## 2024-01-04 DIAGNOSIS — M85.80 OSTEOPENIA, UNSPECIFIED LOCATION: ICD-10-CM

## 2024-01-04 DIAGNOSIS — I10 BENIGN HYPERTENSION: ICD-10-CM

## 2024-01-04 DIAGNOSIS — I87.393 CHRONIC VENOUS HYPERTENSION (IDIOPATHIC) WITH OTHER COMPLICATIONS OF BILATERAL LOWER EXTREMITY: ICD-10-CM

## 2024-01-04 DIAGNOSIS — M85.89 OTHER SPECIFIED DISORDERS OF BONE DENSITY AND STRUCTURE, MULTIPLE SITES: ICD-10-CM

## 2024-01-04 PROBLEM — D69.6 THROMBOCYTOPENIA (CMS-HCC): Status: RESOLVED | Noted: 2023-03-03 | Resolved: 2024-01-04

## 2024-01-04 PROBLEM — S06.5X0A TRAUMATIC SUBDURAL HEMORRHAGE WITHOUT LOSS OF CONSCIOUSNESS, INITIAL ENCOUNTER (MULTI): Status: RESOLVED | Noted: 2023-03-07 | Resolved: 2024-01-04

## 2024-01-04 PROCEDURE — 1159F MED LIST DOCD IN RCRD: CPT | Performed by: FAMILY MEDICINE

## 2024-01-04 PROCEDURE — 3074F SYST BP LT 130 MM HG: CPT | Performed by: FAMILY MEDICINE

## 2024-01-04 PROCEDURE — 99214 OFFICE O/P EST MOD 30 MIN: CPT | Performed by: FAMILY MEDICINE

## 2024-01-04 PROCEDURE — 90662 IIV NO PRSV INCREASED AG IM: CPT | Performed by: FAMILY MEDICINE

## 2024-01-04 PROCEDURE — 1160F RVW MEDS BY RX/DR IN RCRD: CPT | Performed by: FAMILY MEDICINE

## 2024-01-04 PROCEDURE — 1036F TOBACCO NON-USER: CPT | Performed by: FAMILY MEDICINE

## 2024-01-04 PROCEDURE — 1125F AMNT PAIN NOTED PAIN PRSNT: CPT | Performed by: FAMILY MEDICINE

## 2024-01-04 PROCEDURE — G0008 ADMIN INFLUENZA VIRUS VAC: HCPCS | Performed by: FAMILY MEDICINE

## 2024-01-04 PROCEDURE — 3078F DIAST BP <80 MM HG: CPT | Performed by: FAMILY MEDICINE

## 2024-01-04 ASSESSMENT — ENCOUNTER SYMPTOMS
PALPITATIONS: 0
SHORTNESS OF BREATH: 0
FATIGUE: 1

## 2024-01-04 NOTE — PATIENT INSTRUCTIONS
Get your blood work as ordered.  You should hear from our office with results whether they are normal are not within a few days.  Please call the office if you do not hear from us.     After you get testing done you will get notified from our office with regards to your results whether they are normal or not.  If you are registered in the electronic health record we will send you information in that system.  If you have any questions or need clarification please feel free to call the office.       Xrays     DEXA to assess bone thickness       Continue with pain meds as needed       Afib / CAD regular cardiology     Burning in the hands, will check labs, thyroid  Could be arthritis  Seborrhea, skin tags: Can use cortisone as needed
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
## 2024-01-08 ENCOUNTER — LAB (OUTPATIENT)
Dept: LAB | Facility: LAB | Age: 79
End: 2024-01-08
Payer: MEDICARE

## 2024-01-08 ENCOUNTER — HOSPITAL ENCOUNTER (OUTPATIENT)
Dept: RADIOLOGY | Facility: HOSPITAL | Age: 79
Discharge: HOME | End: 2024-01-08
Payer: MEDICARE

## 2024-01-08 ENCOUNTER — ANCILLARY PROCEDURE (OUTPATIENT)
Dept: RADIOLOGY | Facility: CLINIC | Age: 79
End: 2024-01-08
Payer: MEDICARE

## 2024-01-08 DIAGNOSIS — D47.3 ESSENTIAL THROMBOCYTHEMIA (MULTI): ICD-10-CM

## 2024-01-08 DIAGNOSIS — I87.393 CHRONIC VENOUS HYPERTENSION (IDIOPATHIC) WITH OTHER COMPLICATIONS OF BILATERAL LOWER EXTREMITY: ICD-10-CM

## 2024-01-08 DIAGNOSIS — N40.0 BENIGN PROSTATIC HYPERPLASIA WITHOUT LOWER URINARY TRACT SYMPTOMS: ICD-10-CM

## 2024-01-08 DIAGNOSIS — F33.0 MILD EPISODE OF RECURRENT MAJOR DEPRESSIVE DISORDER (CMS-HCC): ICD-10-CM

## 2024-01-08 DIAGNOSIS — E66.01 MORBID OBESITY (MULTI): ICD-10-CM

## 2024-01-08 DIAGNOSIS — I25.10 CORONARY ARTERY DISEASE INVOLVING NATIVE HEART WITHOUT ANGINA PECTORIS, UNSPECIFIED VESSEL OR LESION TYPE: ICD-10-CM

## 2024-01-08 DIAGNOSIS — I10 BENIGN HYPERTENSION: ICD-10-CM

## 2024-01-08 DIAGNOSIS — G47.33 OBSTRUCTIVE SLEEP APNEA: ICD-10-CM

## 2024-01-08 DIAGNOSIS — M25.551 PAIN OF RIGHT HIP: ICD-10-CM

## 2024-01-08 DIAGNOSIS — M85.89 OTHER SPECIFIED DISORDERS OF BONE DENSITY AND STRUCTURE, MULTIPLE SITES: ICD-10-CM

## 2024-01-08 DIAGNOSIS — M85.80 OSTEOPENIA, UNSPECIFIED LOCATION: ICD-10-CM

## 2024-01-08 DIAGNOSIS — S70.02XD CONTUSION OF LEFT HIP, SUBSEQUENT ENCOUNTER: ICD-10-CM

## 2024-01-08 DIAGNOSIS — Z13.820 SCREENING FOR OSTEOPOROSIS: ICD-10-CM

## 2024-01-08 DIAGNOSIS — I48.11 LONGSTANDING PERSISTENT ATRIAL FIBRILLATION (MULTI): ICD-10-CM

## 2024-01-08 LAB
CHOLEST SERPL-MCNC: 137 MG/DL (ref 0–199)
CHOLESTEROL/HDL RATIO: 2.9
ERYTHROCYTE [DISTWIDTH] IN BLOOD BY AUTOMATED COUNT: 13.6 % (ref 11.5–14.5)
HCT VFR BLD AUTO: 46 % (ref 41–52)
HDLC SERPL-MCNC: 47 MG/DL
HGB BLD-MCNC: 15 G/DL (ref 13.5–17.5)
LDLC SERPL CALC-MCNC: 79 MG/DL
MCH RBC QN AUTO: 31.2 PG (ref 26–34)
MCHC RBC AUTO-ENTMCNC: 32.6 G/DL (ref 32–36)
MCV RBC AUTO: 96 FL (ref 80–100)
NON HDL CHOLESTEROL: 90 MG/DL (ref 0–149)
NRBC BLD-RTO: 0 /100 WBCS (ref 0–0)
PLATELET # BLD AUTO: 159 X10*3/UL (ref 150–450)
RBC # BLD AUTO: 4.81 X10*6/UL (ref 4.5–5.9)
T4 FREE SERPL-MCNC: 0.84 NG/DL (ref 0.61–1.12)
TRIGL SERPL-MCNC: 55 MG/DL (ref 0–149)
TSH SERPL-ACNC: 7.33 MIU/L (ref 0.44–3.98)
VLDL: 11 MG/DL (ref 0–40)
WBC # BLD AUTO: 4.2 X10*3/UL (ref 4.4–11.3)

## 2024-01-08 PROCEDURE — 73502 X-RAY EXAM HIP UNI 2-3 VIEWS: CPT | Mod: RT

## 2024-01-08 PROCEDURE — 84443 ASSAY THYROID STIM HORMONE: CPT

## 2024-01-08 PROCEDURE — 85027 COMPLETE CBC AUTOMATED: CPT

## 2024-01-08 PROCEDURE — 73502 X-RAY EXAM HIP UNI 2-3 VIEWS: CPT | Mod: LT

## 2024-01-08 PROCEDURE — 73523 X-RAY EXAM HIPS BI 5/> VIEWS: CPT | Mod: RIGHT SIDE | Performed by: RADIOLOGY

## 2024-01-08 PROCEDURE — 36415 COLL VENOUS BLD VENIPUNCTURE: CPT

## 2024-01-08 PROCEDURE — 77085 DXA BONE DENSITY AXL VRT FX: CPT

## 2024-01-08 PROCEDURE — 84439 ASSAY OF FREE THYROXINE: CPT

## 2024-01-08 PROCEDURE — 77085 DXA BONE DENSITY AXL VRT FX: CPT | Performed by: STUDENT IN AN ORGANIZED HEALTH CARE EDUCATION/TRAINING PROGRAM

## 2024-01-08 PROCEDURE — 80061 LIPID PANEL: CPT

## 2024-01-09 ENCOUNTER — APPOINTMENT (OUTPATIENT)
Dept: RADIOLOGY | Facility: HOSPITAL | Age: 79
End: 2024-01-09
Payer: MEDICARE

## 2024-01-09 DIAGNOSIS — E03.9 ADULT HYPOTHYROIDISM: Primary | ICD-10-CM

## 2024-01-09 RX ORDER — LEVOTHYROXINE SODIUM 75 UG/1
75 TABLET ORAL DAILY
Qty: 90 TABLET | Refills: 1 | Status: SHIPPED | OUTPATIENT
Start: 2024-01-09 | End: 2025-01-08

## 2024-01-10 ENCOUNTER — TELEPHONE (OUTPATIENT)
Dept: PRIMARY CARE | Facility: CLINIC | Age: 79
End: 2024-01-10
Payer: MEDICARE

## 2024-01-10 ENCOUNTER — APPOINTMENT (OUTPATIENT)
Dept: RADIOLOGY | Facility: HOSPITAL | Age: 79
End: 2024-01-10
Payer: MEDICARE

## 2024-01-10 NOTE — TELEPHONE ENCOUNTER
Result Communication    Resulted Orders   XR hip left with pelvis when performed 2 or 3 views    Narrative    Interpreted By:  Zack Olivier,   STUDY:  XR HIP RIGHT WITH PELVIS WHEN PERFORMED 2 OR 3 VIEWS; XR HIP LEFT  WITH PELVIS WHEN PERFORMED 2 OR 3 VIEWS; ;  1/8/2024 8:54 am      INDICATION:  Signs/Symptoms:pain.      COMPARISON:  None.      ACCESSION NUMBER(S):  [id number]; [id number]      ORDERING CLINICIAN:  RODDY DEGROOT      FINDINGS:  Bilateral total hip arthroplasty. No evidence of hardware fracture or  abnormal perihardware lucency. Multilevel degenerative change in the  spine.        Impression    Intact bilateral total hip arthroplasty.          MACRO:  None      Signed by: Zack Olivier 1/9/2024 11:01 AM  Dictation workstation:   [id number]       10:52 AM      Results were successfully communicated with the patient and they acknowledged their understanding.

## 2024-01-10 NOTE — TELEPHONE ENCOUNTER
----- Message from Ruth Lowery MD sent at 1/9/2024 11:58 AM EST -----  Your bone density shows osteopenia which is a thinning of the bones.  I recommend that you take vitamin D 1000 units daily, get an adequate intake of calcium daily.  Postmenopausal women should get 1500 mg of calcium per day.  This is about 4-5 servings per day of calcium rich foods such as dairy products or almond milk.  If you are not getting 1500 mg per day and would recommend adding on a calcium supplement.  Also doing weightbearing exercise is helpful.  We should repeat a DEXA scan 2 years.

## 2024-01-10 NOTE — TELEPHONE ENCOUNTER
----- Message from Ruth Lowery MD sent at 1/9/2024 11:58 AM EST -----  X-ray is okay, prosthesis intact, no fractures

## 2024-01-10 NOTE — TELEPHONE ENCOUNTER
Result Communication    Resulted Orders   XR DEXA bone density axial skeleton w VFA    Narrative    Interpreted By:  Jeanne Velasquez,   STUDY:  DEXA BONE DENSITY AXIAL SKELETON W VFA1/8/2024 10:31 am      INDICATION:  Signs/Symptoms:screen.  The patient is a 77 y/o  year old M.      COMPARISON:  None.      ACCESSION NUMBER(S):  [id number]      ORDERING CLINICIAN:  RODDY DEGOROT      TECHNIQUE:  DEXA BONE DENSITY AXIAL SKELETON W VFA      FINDINGS:  SPINE L1-L4  Bone Mineral Density: 1.277  T-Score 1.7  Z-Score 2.8  Bone Mineral Density change vs baseline:  Not reported  Bone Mineral Density change vs previous: Not reported      LEFT FOREARM  Total  Bone Mineral Density: 0.613  T-Score -1.4  Z-Score 0.3  UD  Bone Mineral Density: 0.466  T-Score -1.4 Z-Score 0.3  MID  Bone Mineral Density: 0.609  T-Score -1.8 Z-Score -0.5  1/3  Bone Mineral Density: 0.814  T-Score -0.1   Z-Score 1.8  Bone Mineral Density change vs baseline:  Not reported  Bone Mineral Density change vs previous:  Not reported          World Health Organization (WHO) criteria for post-menopausal,   Women:  Normal:         T-score at or above -1 SD  Osteopenia:   T-score between -1 and -2.5 SD  Osteoporosis: T-score at or below -2.5 SD          10-year Fracture Risk:  Major Osteoporotic Fracture  Not reported  Hip Fracture                        Not reported      Note:  If no FRAX score is reported, it is because:  Some T-score for Spine Total or Hip Total or Femoral Neck at or below  -2.5      Vertebral Deformity Assessment: Exam Date - 1/8/2024 10:31 am  -----------------------------------------------------------------  Vertebral Level                      Impression  -----------------------------------------------------------------  T4                                        Normal  T5                                        Normal  T6                                        Normal  T7                                        Normal  T8                                         Normal  T9                                        Normal  T10                                      normal  T11                                      normal  T12                                      Normal  L1                                        Normal  L2                                        Normal  L3                                        Normal  L4                                        Normal  -----------------------------------------------------------------  A spine fracture indicates 5X risk for subsequent spine fracture  and 2X risk for subsequent hip fracture.      This exam was performed at [facility] on a HoloDipity Dexa Unit.        Impression    DEXA:  According to World Health Organization criteria,  classification is low bone mass (osteopenia)      Followup recommended in two years or sooner as clinically warranted.      VFA:  NO VERTEBRAL FRACTURES WERE SEEN.      All images and detailed analysis are available on the  Radiology  PACS.      MACRO:  None          Signed by: Jeanne Velasquez 1/8/2024 7:04 PM  Dictation workstation:   [id number]       10:55 AM      Results were successfully communicated with the patient and they acknowledged their understanding.

## 2024-01-10 NOTE — TELEPHONE ENCOUNTER
Result Communication    Resulted Orders   CBC   Result Value Ref Range    WBC 4.2 (L) 4.4 - 11.3 x10*3/uL    nRBC 0.0 0.0 - 0.0 /100 WBCs    RBC 4.81 4.50 - 5.90 x10*6/uL    Hemoglobin 15.0 13.5 - 17.5 g/dL    Hematocrit 46.0 41.0 - 52.0 %    MCV 96 80 - 100 fL    MCH 31.2 26.0 - 34.0 pg    MCHC 32.6 32.0 - 36.0 g/dL    RDW 13.6 11.5 - 14.5 %    Platelets 159 150 - 450 x10*3/uL   Lipid Panel   Result Value Ref Range    Cholesterol 137 0 - 199 mg/dL      Comment:            Age      Desirable   Borderline High   High     0-19 Y     0 - 169       170 - 199     >/= 200    20-24 Y     0 - 189       190 - 224     >/= 225         >24 Y     0 - 199       200 - 239     >/= 240   **All ranges are based on fasting samples. Specific   therapeutic targets will vary based on patient-specific   cardiac risk.    Pediatric guidelines reference:Pediatrics 2011, 128(S5).Adult guidelines reference: NCEP ATPIII Guidelines,BASSAM 2001, 258:2486-97    Venipuncture immediately after or during the administration of Metamizole may lead to falsely low results. Testing should be performed immediately prior to Metamizole dosing.    HDL-Cholesterol 47.0 mg/dL      Comment:        Age       Very Low   Low     Normal    High    0-19 Y    < 35      < 40     40-45     ----  20-24 Y    ----     < 40      >45      ----        >24 Y      ----     < 40     40-60      >60      Cholesterol/HDL Ratio 2.9       Comment:        Ref Values  Desirable  < 3.4  High Risk  > 5.0    LDL Calculated 79 <=99 mg/dL      Comment:                                  Near   Borderline      AGE      Desirable  Optimal    High     High     Very High     0-19 Y     0 - 109     ---    110-129   >/= 130     ----    20-24 Y     0 - 119     ---    120-159   >/= 160     ----      >24 Y     0 -  99   100-129  130-159   160-189     >/=190      VLDL 11 0 - 40 mg/dL    Triglycerides 55 0 - 149 mg/dL      Comment:         Age         Desirable   Borderline High   High     Very High   0  D-90 D    19 - 174         ----         ----        ----  91 D- 9 Y     0 -  74        75 -  99     >/= 100      ----    10-19 Y     0 -  89        90 - 129     >/= 130      ----    20-24 Y     0 - 114       115 - 149     >/= 150      ----         >24 Y     0 - 149       150 - 199    200- 499    >/= 500    Venipuncture immediately after or during the administration of Metamizole may lead to falsely low results. Testing should be performed immediately prior to Metamizole dosing.    Non HDL Cholesterol 90 0 - 149 mg/dL      Comment:            Age       Desirable   Borderline High   High     Very High     0-19 Y     0 - 119       120 - 144     >/= 145    >/= 160    20-24 Y     0 - 149       150 - 189     >/= 190      ----         >24 Y    30 mg/dL above LDL Cholesterol goal     Thyroxine, Free   Result Value Ref Range    Thyroxine, Free 0.84 0.61 - 1.12 ng/dL    Narrative    Thyroxine Free testing is performed using different testing methodology at [facility] than at other [facility]. Direct result comparisons should only be made within the same method.    Biotin can cause falsely elevated free T4 results. Patients taking a Biotin dose of up to 10 mg/day should refrain from taking Biotin for 24 hours before sample collection. Patient taking a Biotin dose of >10 mg/day should consult with their physician or the laboratory before the blood draw.   Thyroid Stimulating Hormone   Result Value Ref Range    Thyroid Stimulating Hormone 7.33 (H) 0.44 - 3.98 mIU/L    Narrative    TSH testing is performed using different testing methodology at [facility] than at other [facility]. Direct result comparisons should only be made within the same method.         10:52 AM      Results were successfully communicated with the patient and they acknowledged their understanding.

## 2024-02-07 PROBLEM — M54.50 LOW BACK PAIN: Status: RESOLVED | Noted: 2022-07-27 | Resolved: 2024-02-07

## 2024-02-07 PROBLEM — Z86.711 HISTORY OF PULMONARY EMBOLISM: Status: ACTIVE | Noted: 2022-07-27

## 2024-02-07 PROBLEM — K44.9 HIATAL HERNIA: Status: ACTIVE | Noted: 2023-03-03

## 2024-02-07 PROBLEM — R19.7 DIARRHEA: Status: ACTIVE | Noted: 2024-02-07

## 2024-02-07 PROBLEM — Z96.649 HISTORY OF TOTAL HIP REPLACEMENT: Status: ACTIVE | Noted: 2024-02-07

## 2024-02-07 PROBLEM — J20.9 ACUTE BRONCHITIS: Status: RESOLVED | Noted: 2024-02-07 | Resolved: 2024-02-07

## 2024-02-07 PROBLEM — R32 INCONTINENCE: Status: ACTIVE | Noted: 2024-02-07

## 2024-02-07 PROBLEM — I99.8 VASCULAR INSUFFICIENCY: Status: ACTIVE | Noted: 2024-02-07

## 2024-02-07 PROBLEM — E66.9 OBESITY WITH BODY MASS INDEX 30 OR GREATER: Status: ACTIVE | Noted: 2024-02-07

## 2024-02-07 PROBLEM — M85.80 OSTEOPENIA: Status: ACTIVE | Noted: 2024-02-07

## 2024-02-07 PROBLEM — K52.9 NONINFECTIOUS DIARRHEA: Status: RESOLVED | Noted: 2023-03-03 | Resolved: 2024-02-07

## 2024-02-07 PROBLEM — N52.9 ERECTILE DYSFUNCTION: Status: ACTIVE | Noted: 2024-02-07

## 2024-02-07 PROBLEM — S70.12XA HEMATOMA OF LEFT THIGH: Status: ACTIVE | Noted: 2023-02-24

## 2024-02-07 PROBLEM — D62 ACUTE POSTHEMORRHAGIC ANEMIA: Status: RESOLVED | Noted: 2022-07-27 | Resolved: 2024-02-07

## 2024-02-07 PROBLEM — Z86.718 HISTORY OF DEEP VENOUS THROMBOSIS: Status: ACTIVE | Noted: 2022-07-27

## 2024-02-07 PROBLEM — R52 UNCONTROLLED PAIN: Status: ACTIVE | Noted: 2024-02-07

## 2024-02-07 PROBLEM — Z86.69 HISTORY OF VISUAL DISTURBANCE: Status: ACTIVE | Noted: 2024-02-07

## 2024-02-07 PROBLEM — Z20.822 CONTACT WITH AND (SUSPECTED) EXPOSURE TO COVID-19: Status: RESOLVED | Noted: 2023-02-27 | Resolved: 2024-02-07

## 2024-02-07 PROBLEM — Z86.79 HISTORY OF ENDOCARDITIS: Status: ACTIVE | Noted: 2024-02-07

## 2024-02-07 PROBLEM — Z86.16 PERSONAL HISTORY OF COVID-19: Status: ACTIVE | Noted: 2023-02-27

## 2024-02-07 PROBLEM — Z91.198 PATIENT'S NONCOMPLIANCE WITH OTHER MEDICAL TREATMENT AND REGIMEN FOR OTHER REASON: Status: ACTIVE | Noted: 2023-03-20
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
## 2024-02-07 PROBLEM — L57.0 ACTINIC KERATOSIS: Status: ACTIVE | Noted: 2024-02-07

## 2024-02-07 PROBLEM — F32.A DEPRESSIVE DISORDER: Status: ACTIVE | Noted: 2023-03-03

## 2024-02-07 PROBLEM — R51.9 HEADACHE: Status: ACTIVE | Noted: 2024-02-07

## 2024-02-07 PROBLEM — Z86.39 HISTORY OF ELEVATED LIPIDS: Status: ACTIVE | Noted: 2024-02-07

## 2024-02-07 PROBLEM — M89.9 DISORDER OF BONE: Status: ACTIVE | Noted: 2024-02-07

## 2024-02-07 PROBLEM — G89.29 CHRONIC PAIN: Status: ACTIVE | Noted: 2024-02-07

## 2024-02-07 PROBLEM — K59.00 CONSTIPATION: Status: ACTIVE | Noted: 2024-02-07

## 2024-02-07 PROBLEM — D64.9 ANEMIA: Status: ACTIVE | Noted: 2024-02-07

## 2024-02-07 PROBLEM — L73.9 FOLLICULITIS: Status: RESOLVED | Noted: 2024-02-07 | Resolved: 2024-02-07

## 2024-02-07 PROBLEM — I25.2 HISTORY OF MYOCARDIAL INFARCTION: Status: ACTIVE | Noted: 2024-02-07
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
## 2024-02-07 PROBLEM — M79.10 MUSCLE PAIN: Status: ACTIVE | Noted: 2024-02-07

## 2024-02-07 PROBLEM — R42 DIZZINESS: Status: ACTIVE | Noted: 2024-02-07

## 2024-02-07 PROBLEM — S89.90XA INJURY OF LOWER EXTREMITY: Status: RESOLVED | Noted: 2024-02-07 | Resolved: 2024-02-07

## 2024-02-09 ENCOUNTER — APPOINTMENT (OUTPATIENT)
Dept: PRIMARY CARE | Facility: CLINIC | Age: 79
End: 2024-02-09
Payer: MEDICARE

## 2024-02-15 ENCOUNTER — OFFICE VISIT (OUTPATIENT)
Dept: PRIMARY CARE | Facility: CLINIC | Age: 79
End: 2024-02-15
Payer: MEDICARE

## 2024-02-15 VITALS
DIASTOLIC BLOOD PRESSURE: 76 MMHG | SYSTOLIC BLOOD PRESSURE: 117 MMHG | WEIGHT: 264 LBS | TEMPERATURE: 98.1 F | OXYGEN SATURATION: 97 % | HEART RATE: 67 BPM | HEIGHT: 71 IN | BODY MASS INDEX: 36.96 KG/M2

## 2024-02-15 DIAGNOSIS — Z00.00 ROUTINE GENERAL MEDICAL EXAMINATION AT HEALTH CARE FACILITY: Primary | ICD-10-CM

## 2024-02-15 DIAGNOSIS — N40.0 BENIGN PROSTATIC HYPERPLASIA WITHOUT LOWER URINARY TRACT SYMPTOMS: ICD-10-CM

## 2024-02-15 DIAGNOSIS — K63.5 POLYP OF COLON, UNSPECIFIED PART OF COLON, UNSPECIFIED TYPE: ICD-10-CM

## 2024-02-15 DIAGNOSIS — F33.0 MILD EPISODE OF RECURRENT MAJOR DEPRESSIVE DISORDER (CMS-HCC): ICD-10-CM

## 2024-02-15 DIAGNOSIS — I10 BENIGN HYPERTENSION: ICD-10-CM

## 2024-02-15 DIAGNOSIS — E03.9 ADULT HYPOTHYROIDISM: ICD-10-CM

## 2024-02-15 DIAGNOSIS — M79.642 PAIN OF LEFT HAND: ICD-10-CM

## 2024-02-15 DIAGNOSIS — E66.01 MORBID OBESITY (MULTI): ICD-10-CM

## 2024-02-15 DIAGNOSIS — K59.04 CHRONIC IDIOPATHIC CONSTIPATION: ICD-10-CM

## 2024-02-15 DIAGNOSIS — I25.10 CORONARY ARTERY DISEASE INVOLVING NATIVE HEART WITHOUT ANGINA PECTORIS, UNSPECIFIED VESSEL OR LESION TYPE: ICD-10-CM

## 2024-02-15 PROBLEM — R74.8 ELEVATED LIVER ENZYMES: Status: RESOLVED | Noted: 2023-03-03 | Resolved: 2024-02-15

## 2024-02-15 PROBLEM — R52 UNCONTROLLED PAIN: Status: RESOLVED | Noted: 2024-02-07 | Resolved: 2024-02-15

## 2024-02-15 PROCEDURE — 99214 OFFICE O/P EST MOD 30 MIN: CPT | Performed by: FAMILY MEDICINE

## 2024-02-15 PROCEDURE — 1159F MED LIST DOCD IN RCRD: CPT | Performed by: FAMILY MEDICINE

## 2024-02-15 PROCEDURE — 1160F RVW MEDS BY RX/DR IN RCRD: CPT | Performed by: FAMILY MEDICINE

## 2024-02-15 PROCEDURE — 1170F FXNL STATUS ASSESSED: CPT | Performed by: FAMILY MEDICINE

## 2024-02-15 PROCEDURE — G0439 PPPS, SUBSEQ VISIT: HCPCS | Performed by: FAMILY MEDICINE

## 2024-02-15 PROCEDURE — 3074F SYST BP LT 130 MM HG: CPT | Performed by: FAMILY MEDICINE

## 2024-02-15 PROCEDURE — 3078F DIAST BP <80 MM HG: CPT | Performed by: FAMILY MEDICINE

## 2024-02-15 PROCEDURE — 1125F AMNT PAIN NOTED PAIN PRSNT: CPT | Performed by: FAMILY MEDICINE

## 2024-02-15 PROCEDURE — 1036F TOBACCO NON-USER: CPT | Performed by: FAMILY MEDICINE

## 2024-02-15 ASSESSMENT — PATIENT HEALTH QUESTIONNAIRE - PHQ9
8. MOVING OR SPEAKING SO SLOWLY THAT OTHER PEOPLE COULD HAVE NOTICED. OR THE OPPOSITE, BEING SO FIGETY OR RESTLESS THAT YOU HAVE BEEN MOVING AROUND A LOT MORE THAN USUAL: MORE THAN HALF THE DAYS
10. IF YOU CHECKED OFF ANY PROBLEMS, HOW DIFFICULT HAVE THESE PROBLEMS MADE IT FOR YOU TO DO YOUR WORK, TAKE CARE OF THINGS AT HOME, OR GET ALONG WITH OTHER PEOPLE: SOMEWHAT DIFFICULT
4. FEELING TIRED OR HAVING LITTLE ENERGY: NEARLY EVERY DAY
SUM OF ALL RESPONSES TO PHQ9 QUESTIONS 1 AND 2: 4
3. TROUBLE FALLING OR STAYING ASLEEP OR SLEEPING TOO MUCH: NEARLY EVERY DAY
7. TROUBLE CONCENTRATING ON THINGS, SUCH AS READING THE NEWSPAPER OR WATCHING TELEVISION: NEARLY EVERY DAY
2. FEELING DOWN, DEPRESSED OR HOPELESS: SEVERAL DAYS
5. POOR APPETITE OR OVEREATING: NOT AT ALL
6. FEELING BAD ABOUT YOURSELF - OR THAT YOU ARE A FAILURE OR HAVE LET YOURSELF OR YOUR FAMILY DOWN: MORE THAN HALF THE DAYS
1. LITTLE INTEREST OR PLEASURE IN DOING THINGS: NEARLY EVERY DAY
SUM OF ALL RESPONSES TO PHQ QUESTIONS 1-9: 17
9. THOUGHTS THAT YOU WOULD BE BETTER OFF DEAD, OR OF HURTING YOURSELF: NOT AT ALL

## 2024-02-15 ASSESSMENT — ENCOUNTER SYMPTOMS
DIARRHEA: 0
SHORTNESS OF BREATH: 0
CONSTIPATION: 1
BLOOD IN STOOL: 0

## 2024-02-15 ASSESSMENT — ACTIVITIES OF DAILY LIVING (ADL)
DRESSING: INDEPENDENT
GROCERY_SHOPPING: INDEPENDENT
TAKING_MEDICATION: INDEPENDENT
MANAGING_FINANCES: INDEPENDENT
DOING_HOUSEWORK: INDEPENDENT
BATHING: INDEPENDENT

## 2024-02-15 NOTE — PATIENT INSTRUCTIONS
Arthritis of the hand: Use Tylenol  Will check an x-ray    Constipation alternating with loose stools, patient had a large polyp in 2018 that did not get follow-up    Atrial fibrillation, coronary artery disease: Needs to see cardiology discussed medications recommend seeing them yearly    Hypothyroidism: Hypothyroidism, once your thyroid is low but requires indefinite treatment  It is important that you continue your medications as directed by physician.  In most cases with hypothyroidism people require medications lifelong.  If you have changes in your symptoms such as increasing fatigue, weight gain, dry skin please let your physician.    Depression plan:  It is important that you stay active, regular exercise and healthy diet are important.  Counseling oftentimes can be beneficial to people with depression if you would like that please let us know.  If you are on medications you should be following up at least twice per year.  It is important to let us know if you have side effects from your medications or if you feel that they are not working.  It is especially important to let us know if you should have any suicidal thoughts.    You should be getting cardiovascular exercise 3-5 times per week for 30-45 minutes.  This includes exercises such as running, brisk walking, biking or swimming.     Sleep apnea:    You have sleep apnea.  This is a problem where you are not breathing effectively throughout the night.  This causes decreased oxygen to your system and can cause things like fatigue and difficulty sleeping.  It is important to treat sleep apnea because it can also cause other health problems such as heart disease and heart arrhythmias.    If you are overweight, the best thing you can do is work on weight loss as this increases the risk of sleep apnea.  Minimize your use of alcohol especially in the evening.  Elevate the head of your bed.      It is important to actively try to reduce your weight to become  healthier.  There are several things you can do to try and lose weight.  You should be getting 30-45 minutes of cardiovascular exercise 3-5 times per week, activities such as running and jogging treadmill swimming and brisk walking are helpful.  You will also need to watch your portion control, decrease calorie intake overall.  Following a low carbohydrate diet is the most successful way to lose weight and take it off long-term.  In order to achieve weight loss it is important that you track exactly what your calorie intake is during the day.  I usually recommend doing some sort of formal program or [place]. there are lot of good Apps out there to help you follow your calorie intake such as weight watchers, Noom, Fitbit.  It is recommended that you reduce the intake of sweets, sugar, reduce carbohydrate intake.  Healthy diets with more whole grains, vegetables, fruits, nuts and seeds with plant oils are healthier diets than animal-based fats.  Reduce your intake of white bread, grains, potatoes, processed foods.

## 2024-02-15 NOTE — PROGRESS NOTES
"Subjective   Reason for Visit: Leonides Diana is an 78 y.o. male here for a Medicare Wellness visit.     Past Medical, Surgical, and Family History reviewed and updated in chart.    Reviewed all medications by prescribing practitioner or clinical pharmacist (such as prescriptions, OTCs, herbal therapies and supplements) and documented in the medical record.    Bowels are irregular ,  constipation and then frequent stools ,  fiber supplements and stool softners  ,takes daily   Has gas ,  hx of large polyp in 2018 ,  didn't get a follow up colonoscopy     Left hand with pain in AM,  over last year   Soreness  Some stiffness          Atrial fibrillation, coronary artery disease:, chronic, on blood thinners, sees cardiology, Dr Oscar mack, no palpitations, chest pain,        Major depressive disorder, mood ok  on venlafaxine  Some stress at home     Lumbar radiculopathy: Seeing pain management and getting injections    Hypothyroidism, stable energy level    BPH: On finasteride, urinating ok      Moles on back  ,itch and painful  , no bleeding      Obstructive sleep apnea, using CPAP      Morbid obesity             Patient Care Team:  Ruth Lowery MD as PCP - General (Family Medicine)  DANA Danielle as PCP - [facility] Attributed Provider     Review of Systems   Respiratory:  Negative for shortness of breath.    Cardiovascular:  Negative for chest pain.   Gastrointestinal:  Positive for constipation. Negative for blood in stool and diarrhea.       Objective   Vitals:  /76   Pulse 67   Temp 36.7 °C (98.1 °F)   Ht 1.803 m (5' 11\")   Wt 120 kg (264 lb)   SpO2 97%   BMI 36.82 kg/m²       Physical Exam  Constitutional:       General: He is not in acute distress.     Appearance: Normal appearance.   HENT:      Head: Normocephalic and atraumatic.      Mouth/Throat:      Mouth: Mucous membranes are moist.      Pharynx: No oropharyngeal exudate or posterior oropharyngeal erythema.   Eyes:      " Extraocular Movements: Extraocular movements intact.      Pupils: Pupils are equal, round, and reactive to light.   Neck:      Vascular: No carotid bruit.   Cardiovascular:      Rate and Rhythm: Normal rate and regular rhythm.      Heart sounds: No murmur heard.  Pulmonary:      Effort: Pulmonary effort is normal.      Breath sounds: Normal breath sounds. No wheezing or rhonchi.   Abdominal:      General: Bowel sounds are normal.      Palpations: Abdomen is soft.   Musculoskeletal:         General: Deformity present.      Cervical back: No rigidity.      Comments: Asymmetric gait antalgic gait    Left hand pulse normal  Arthritic nodularity   strength 4 out of 5   Lymphadenopathy:      Cervical: No cervical adenopathy.   Skin:     General: Skin is warm and dry.      Findings: No rash.   Neurological:      General: No focal deficit present.      Mental Status: He is alert and oriented to person, place, and time.      Cranial Nerves: No cranial nerve deficit.      Gait: Gait normal.      Deep Tendon Reflexes: Reflexes normal.   Psychiatric:         Mood and Affect: Mood normal.         Behavior: Behavior normal.         Assessment/Plan   Problem List Items Addressed This Visit       Adult hypothyroidism    Benign hypertension    BPH (benign prostatic hyperplasia)    CAD (coronary artery disease)    Major depression, recurrent (CMS/HCC)    Morbid obesity (CMS/HCC)    Constipation    Relevant Orders    Referral to Gastroenterology     Other Visit Diagnoses       Routine general medical examination at health care facility    -  Primary    Polyp of colon, unspecified part of colon, unspecified type        Relevant Orders    Referral to Gastroenterology    Pain of left hand        Relevant Orders    XR hand left 1-2 views

## 2024-02-16 ENCOUNTER — HOSPITAL ENCOUNTER (OUTPATIENT)
Dept: RADIOLOGY | Facility: CLINIC | Age: 79
Discharge: HOME | End: 2024-02-16
Payer: MEDICARE

## 2024-02-16 ENCOUNTER — TELEPHONE (OUTPATIENT)
Dept: PAIN MEDICINE | Facility: CLINIC | Age: 79
End: 2024-02-16

## 2024-02-16 DIAGNOSIS — M15.9 GENERALIZED OSTEOARTHRITIS OF MULTIPLE SITES: ICD-10-CM

## 2024-02-16 DIAGNOSIS — M79.642 PAIN OF LEFT HAND: ICD-10-CM

## 2024-02-16 DIAGNOSIS — M47.816 SPONDYLOSIS OF LUMBAR REGION WITHOUT MYELOPATHY OR RADICULOPATHY: ICD-10-CM

## 2024-02-16 DIAGNOSIS — M54.16 BILATERAL LUMBAR RADICULOPATHY: ICD-10-CM

## 2024-02-16 PROCEDURE — 73130 X-RAY EXAM OF HAND: CPT | Mod: LT

## 2024-02-16 PROCEDURE — 73130 X-RAY EXAM OF HAND: CPT | Mod: LEFT SIDE | Performed by: RADIOLOGY

## 2024-02-18 DIAGNOSIS — N40.1 BENIGN PROSTATIC HYPERPLASIA WITH URINARY FREQUENCY: ICD-10-CM

## 2024-02-18 DIAGNOSIS — F33.9 RECURRENT MAJOR DEPRESSIVE DISORDER, REMISSION STATUS UNSPECIFIED (CMS-HCC): ICD-10-CM

## 2024-02-18 DIAGNOSIS — R35.0 BENIGN PROSTATIC HYPERPLASIA WITH URINARY FREQUENCY: ICD-10-CM

## 2024-02-19 ENCOUNTER — TELEPHONE (OUTPATIENT)
Dept: PRIMARY CARE | Facility: CLINIC | Age: 79
End: 2024-02-19
Payer: MEDICARE

## 2024-02-19 PROBLEM — K63.5 POLYP OF COLON: Status: ACTIVE | Noted: 2024-02-19

## 2024-02-19 PROBLEM — K59.04 CHRONIC IDIOPATHIC CONSTIPATION: Status: ACTIVE | Noted: 2024-02-19

## 2024-02-19 PROBLEM — M79.642 PAIN OF LEFT HAND: Status: RESOLVED | Noted: 2024-02-19 | Resolved: 2024-02-19

## 2024-02-19 RX ORDER — HYDROCODONE BITARTRATE AND ACETAMINOPHEN 7.5; 325 MG/1; MG/1
1 TABLET ORAL 2 TIMES DAILY
Qty: 56 TABLET | Refills: 0 | Status: SHIPPED | OUTPATIENT
Start: 2024-02-19 | End: 2024-03-18

## 2024-02-19 RX ORDER — VENLAFAXINE HYDROCHLORIDE 150 MG/1
150 CAPSULE, EXTENDED RELEASE ORAL DAILY
Qty: 90 CAPSULE | Refills: 3 | Status: SHIPPED | OUTPATIENT
Start: 2024-02-19

## 2024-02-19 RX ORDER — FINASTERIDE 1 MG/1
1 TABLET, FILM COATED ORAL DAILY
Qty: 90 TABLET | Refills: 3 | Status: SHIPPED | OUTPATIENT
Start: 2024-02-19

## 2024-02-19 NOTE — TELEPHONE ENCOUNTER
Result Communication    Resulted Orders   XR hand left 3+ views    Narrative    Interpreted By:  Jules Thakur,   STUDY:  XR HAND LEFT 3+ VIEWS; ;  2/16/2024 12:02 pm      INDICATION:  Signs/Symptoms:pain.      COMPARISON:  None.      ACCESSION NUMBER(S):  [id number]      ORDERING CLINICIAN:  RODDY DEGROOT      FINDINGS:  Left hand, three views      There is severe joint space narrowing with mild subluxation of the  2nd DIP joint. Small ossific fragments present in the surrounding  soft tissue along with soft tissue edema. There is severe narrowing  in the CS 3rd DIP joint. There is moderate narrowing with  osteophytosis in the 2nd and 3rd MCP joints. There is severe joint  space narrowing with sclerosis and large osteophytosis in the 1st  carpometacarpal joint and in the triscaphe joint. There is moderate  1st IP osteoarthritis as well. No erosions seen. There is  chondrocalcinosis in the region of the TFCC.        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 No definite erosions seen. There is  chondrocalcinosis.          MACRO:  None      Signed by: Jules Thakur 2/17/2024 9:14 AM  Dictation workstation:   [id number]       12:14 PM      Results were successfully communicated with the patient and they acknowledged their understanding.

## 2024-02-19 NOTE — TELEPHONE ENCOUNTER
----- Message from Ruth Lowery MD sent at 2/17/2024 10:20 AM EST -----  Pretty severe arthritis in the hand with some tendinitis, would suggest seeing a hand specialist to see if any injections are indicated

## 2024-03-11 DIAGNOSIS — K21.9 GASTROESOPHAGEAL REFLUX DISEASE WITHOUT ESOPHAGITIS: ICD-10-CM

## 2024-03-11 RX ORDER — PANTOPRAZOLE SODIUM 40 MG/1
40 TABLET, DELAYED RELEASE ORAL DAILY
Qty: 90 TABLET | Refills: 3 | Status: SHIPPED | OUTPATIENT
Start: 2024-03-11

## 2024-03-21 DIAGNOSIS — K90.89 BILE SALT-INDUCED DIARRHEA (HHS-HCC): ICD-10-CM

## 2024-03-23 RX ORDER — DICYCLOMINE HYDROCHLORIDE 20 MG/1
TABLET ORAL
Qty: 90 TABLET | Refills: 0 | Status: SHIPPED | OUTPATIENT
Start: 2024-03-23

## 2024-05-03 DIAGNOSIS — I10 BENIGN HYPERTENSION: ICD-10-CM

## 2024-05-03 RX ORDER — DOXAZOSIN 1 MG/1
1 TABLET ORAL DAILY
Qty: 90 TABLET | Refills: 3 | Status: SHIPPED | OUTPATIENT
Start: 2024-05-03

## 2024-05-09 ENCOUNTER — OFFICE VISIT (OUTPATIENT)
Dept: GASTROENTEROLOGY | Facility: CLINIC | Age: 79
End: 2024-05-09
Payer: MEDICARE

## 2024-05-09 VITALS
WEIGHT: 268 LBS | HEART RATE: 72 BPM | DIASTOLIC BLOOD PRESSURE: 69 MMHG | BODY MASS INDEX: 37.38 KG/M2 | SYSTOLIC BLOOD PRESSURE: 105 MMHG

## 2024-05-09 DIAGNOSIS — K59.04 CHRONIC IDIOPATHIC CONSTIPATION: ICD-10-CM

## 2024-05-09 DIAGNOSIS — I48.92 ATRIAL FIB/FLUTTER, TRANSIENT (MULTI): ICD-10-CM

## 2024-05-09 DIAGNOSIS — D12.6 TUBULOVILLOUS ADENOMA OF COLON: Primary | ICD-10-CM

## 2024-05-09 DIAGNOSIS — K63.5 POLYP OF COLON, UNSPECIFIED PART OF COLON, UNSPECIFIED TYPE: ICD-10-CM

## 2024-05-09 DIAGNOSIS — I48.91 ATRIAL FIB/FLUTTER, TRANSIENT (MULTI): ICD-10-CM

## 2024-05-09 DIAGNOSIS — R10.32 LLQ PAIN: ICD-10-CM

## 2024-05-09 PROCEDURE — 3078F DIAST BP <80 MM HG: CPT | Performed by: INTERNAL MEDICINE

## 2024-05-09 PROCEDURE — 3074F SYST BP LT 130 MM HG: CPT | Performed by: INTERNAL MEDICINE

## 2024-05-09 PROCEDURE — 1160F RVW MEDS BY RX/DR IN RCRD: CPT | Performed by: INTERNAL MEDICINE

## 2024-05-09 PROCEDURE — 99204 OFFICE O/P NEW MOD 45 MIN: CPT | Performed by: INTERNAL MEDICINE

## 2024-05-09 PROCEDURE — 1159F MED LIST DOCD IN RCRD: CPT | Performed by: INTERNAL MEDICINE

## 2024-05-09 RX ORDER — ENOXAPARIN SODIUM 150 MG/ML
120 INJECTION SUBCUTANEOUS 2 TIMES DAILY
Qty: 10 EACH | Refills: 0 | Status: SHIPPED | OUTPATIENT
Start: 2024-05-09 | End: 2024-05-14

## 2024-05-09 RX ORDER — POLYETHYLENE GLYCOL 3350, SODIUM SULFATE ANHYDROUS, SODIUM BICARBONATE, SODIUM CHLORIDE, POTASSIUM CHLORIDE 236; 22.74; 6.74; 5.86; 2.97 G/4L; G/4L; G/4L; G/4L; G/4L
4000 POWDER, FOR SOLUTION ORAL ONCE
Qty: 4000 ML | Refills: 0 | Status: SHIPPED | OUTPATIENT
Start: 2024-05-09 | End: 2024-05-09

## 2024-05-09 RX ORDER — POLYETHYLENE GLYCOL 3350 17 G/17G
17 POWDER, FOR SOLUTION ORAL DAILY
Qty: 30 PACKET | Refills: 2 | Status: SHIPPED | OUTPATIENT
Start: 2024-05-09 | End: 2025-05-09

## 2024-05-09 ASSESSMENT — ENCOUNTER SYMPTOMS
ABDOMINAL PAIN: 1
CONSTIPATION: 1
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
## 2024-06-10 ENCOUNTER — TELEPHONE (OUTPATIENT)
Dept: PREADMISSION TESTING | Facility: HOSPITAL | Age: 79
End: 2024-06-10
Payer: MEDICARE

## 2024-06-11 ENCOUNTER — ANESTHESIA EVENT (OUTPATIENT)
Dept: OPERATING ROOM | Facility: HOSPITAL | Age: 79
End: 2024-06-11
Payer: MEDICARE

## 2024-06-12 ENCOUNTER — ANESTHESIA (OUTPATIENT)
Dept: OPERATING ROOM | Facility: HOSPITAL | Age: 79
End: 2024-06-12
Payer: MEDICARE

## 2024-06-12 ENCOUNTER — HOSPITAL ENCOUNTER (OUTPATIENT)
Dept: OPERATING ROOM | Facility: HOSPITAL | Age: 79
Setting detail: OUTPATIENT SURGERY
Discharge: HOME | End: 2024-06-12
Payer: MEDICARE

## 2024-06-12 VITALS
HEIGHT: 65 IN | RESPIRATION RATE: 16 BRPM | BODY MASS INDEX: 43.18 KG/M2 | HEART RATE: 53 BPM | WEIGHT: 259.15 LBS | OXYGEN SATURATION: 100 % | DIASTOLIC BLOOD PRESSURE: 74 MMHG | SYSTOLIC BLOOD PRESSURE: 110 MMHG | TEMPERATURE: 97.9 F

## 2024-06-12 DIAGNOSIS — D12.6 TUBULOVILLOUS ADENOMA OF COLON: ICD-10-CM

## 2024-06-12 LAB
POCT INTERNATIONAL NORMALIZATION RATIO: 1.2
POCT PROTHROMBIN TIME: 0 SECONDS

## 2024-06-12 PROCEDURE — 7100000010 HC PHASE TWO TIME - EACH INCREMENTAL 1 MINUTE: Performed by: ANESTHESIOLOGY

## 2024-06-12 PROCEDURE — 2500000005 HC RX 250 GENERAL PHARMACY W/O HCPCS: Performed by: NURSE ANESTHETIST, CERTIFIED REGISTERED

## 2024-06-12 PROCEDURE — 3700000001 HC GENERAL ANESTHESIA TIME - INITIAL BASE CHARGE: Performed by: ANESTHESIOLOGY

## 2024-06-12 PROCEDURE — 45385 COLONOSCOPY W/LESION REMOVAL: CPT | Performed by: INTERNAL MEDICINE

## 2024-06-12 PROCEDURE — 3600000002 HC OR TIME - INITIAL BASE CHARGE - PROCEDURE LEVEL TWO: Performed by: ANESTHESIOLOGY

## 2024-06-12 PROCEDURE — 45380 COLONOSCOPY AND BIOPSY: CPT | Performed by: INTERNAL MEDICINE

## 2024-06-12 PROCEDURE — 2500000004 HC RX 250 GENERAL PHARMACY W/ HCPCS (ALT 636 FOR OP/ED): Performed by: ANESTHESIOLOGY

## 2024-06-12 PROCEDURE — 0753T DGTZ GLS MCRSCP SLD LEVEL IV: CPT | Mod: TC,GEALAB | Performed by: INTERNAL MEDICINE

## 2024-06-12 PROCEDURE — 2500000004 HC RX 250 GENERAL PHARMACY W/ HCPCS (ALT 636 FOR OP/ED): Performed by: NURSE ANESTHETIST, CERTIFIED REGISTERED

## 2024-06-12 PROCEDURE — 85610 PROTHROMBIN TIME: CPT | Performed by: ANESTHESIOLOGY

## 2024-06-12 PROCEDURE — 7100000009 HC PHASE TWO TIME - INITIAL BASE CHARGE: Performed by: ANESTHESIOLOGY

## 2024-06-12 PROCEDURE — 45381 COLONOSCOPY SUBMUCOUS NJX: CPT | Performed by: INTERNAL MEDICINE

## 2024-06-12 PROCEDURE — 96372 THER/PROPH/DIAG INJ SC/IM: CPT | Performed by: NURSE ANESTHETIST, CERTIFIED REGISTERED

## 2024-06-12 PROCEDURE — 88305 TISSUE EXAM BY PATHOLOGIST: CPT | Performed by: PATHOLOGY

## 2024-06-12 PROCEDURE — 3600000007 HC OR TIME - EACH INCREMENTAL 1 MINUTE - PROCEDURE LEVEL TWO: Performed by: ANESTHESIOLOGY

## 2024-06-12 PROCEDURE — 2720000007 HC OR 272 NO HCPCS: Performed by: ANESTHESIOLOGY

## 2024-06-12 PROCEDURE — 3700000002 HC GENERAL ANESTHESIA TIME - EACH INCREMENTAL 1 MINUTE: Performed by: ANESTHESIOLOGY

## 2024-06-12 RX ORDER — PROPOFOL 10 MG/ML
INJECTION, EMULSION INTRAVENOUS AS NEEDED
Status: DISCONTINUED | OUTPATIENT
Start: 2024-06-12 | End: 2024-06-12

## 2024-06-12 RX ORDER — PHENYLEPHRINE HCL IN 0.9% NACL 0.4MG/10ML
SYRINGE (ML) INTRAVENOUS AS NEEDED
Status: DISCONTINUED | OUTPATIENT
Start: 2024-06-12 | End: 2024-06-12

## 2024-06-12 RX ORDER — LIDOCAINE HYDROCHLORIDE 10 MG/ML
INJECTION, SOLUTION EPIDURAL; INFILTRATION; INTRACAUDAL; PERINEURAL AS NEEDED
Status: DISCONTINUED | OUTPATIENT
Start: 2024-06-12 | End: 2024-06-12

## 2024-06-12 RX ORDER — FENTANYL CITRATE 50 UG/ML
INJECTION, SOLUTION INTRAMUSCULAR; INTRAVENOUS AS NEEDED
Status: DISCONTINUED | OUTPATIENT
Start: 2024-06-12 | End: 2024-06-12

## 2024-06-12 RX ORDER — SODIUM CHLORIDE, SODIUM LACTATE, POTASSIUM CHLORIDE, CALCIUM CHLORIDE 600; 310; 30; 20 MG/100ML; MG/100ML; MG/100ML; MG/100ML
100 INJECTION, SOLUTION INTRAVENOUS CONTINUOUS
Status: DISCONTINUED | OUTPATIENT
Start: 2024-06-12 | End: 2024-06-13 | Stop reason: HOSPADM

## 2024-06-12 SDOH — HEALTH STABILITY: MENTAL HEALTH: CURRENT SMOKER: 0

## 2024-06-12 ASSESSMENT — PAIN SCALES - GENERAL
PAINLEVEL_OUTOF10: 0 - NO PAIN
PAINLEVEL_OUTOF10: 0 - NO PAIN

## 2024-06-12 ASSESSMENT — PAIN - FUNCTIONAL ASSESSMENT: PAIN_FUNCTIONAL_ASSESSMENT: 0-10

## 2024-06-12 ASSESSMENT — ENCOUNTER SYMPTOMS: OCCASIONAL FEELINGS OF UNSTEADINESS: 1
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
## 2024-06-12 NOTE — ANESTHESIA POSTPROCEDURE EVALUATION
"Patient: Leonides Diana \"Roddy\"    Procedure Summary       Date: 06/12/24 Room / Location: [facility] OR    Anesthesia Start: 0900 Anesthesia Stop: 1017    Procedure: COLONOSCOPY Diagnosis: Tubulovillous adenoma of colon    Scheduled Providers: Leona Baca MD; Pete Paige MD Responsible Provider: Pete Paige MD    Anesthesia Type: MAC ASA Status: 3            Anesthesia Type: MAC    Vitals Value Taken Time   BP See vitals 06/12/24 1020   Temp  06/12/24 1020   Pulse  06/12/24 1020   Resp  06/12/24 1020   SpO2  06/12/24 1020       Anesthesia Post Evaluation    Patient location during evaluation: PACU  Patient participation: complete - patient participated  Level of consciousness: awake  Pain management: adequate  Multimodal analgesia pain management approach  Airway patency: patent  Two or more strategies used to mitigate risk of obstructive sleep apnea  Cardiovascular status: acceptable  Respiratory status: acceptable  Hydration status: acceptable  Postoperative Nausea and Vomiting: none        No notable events documented.    "

## 2024-06-12 NOTE — DISCHARGE INSTRUCTIONS
Patient Instructions after a Colonoscopy      The anesthetics, sedatives or narcotics which were given to you today will be acting in your body for the next 24 hours, so you might feel a little sleepy or groggy.  This feeling should slowly wear off. Carefully read and follow the instructions.     You received sedation today:  - Do not drive or operate any machinery or power tools of any kind.   - No alcoholic beverages today, not even beer or wine.  - Do not make any important decisions or sign any legal documents.  - No over the counter medications that contain alcohol or that may cause drowsiness.  - Do not make any important decisions or sign any legal documents.  - Make sure you have someone with you for first 24 hours.    While it is common to experience mild to moderate abdominal distention, gas, or belching after your procedure, if any of these symptoms occur following discharge from the GI Lab or within one week of having your procedure, call the [facility] to be advised whether a visit to your nearest Urgent Care or Emergency Department is indicated.  Take this paper with you if you go.     - If you develop an allergic reaction to the medications that were given during your procedure such as difficulty breathing, rash, hives, severe nausea, vomiting or lightheadedness.  - If you experience chest pain, shortness of breath, severe abdominal pain, fevers and chills.  -If you develop signs and symptoms of bleeding such as blood in your spit, if your stools turn black, tarry, or bloody  - If you have not urinated within 8 hours following your procedure.  - If your IV site becomes painful, red, inflamed, or looks infected.    If you received a biopsy/polypectomy/sphincterotomy the following instructions apply below:    __ Do not use Aspirin containing products, non-steroidal medications or anti-coagulants for one week following your procedure. (Examples of these types of medications are: Advil,  Arthrotec, Aleve, Coumadin, Ecotrin, Heparin, Ibuprofen, Indocin, Motrin, Naprosyn, Nuprin, Plavix, Vioxx, and Voltarin, or their generic forms.  This list is not all-inclusive.  Check with your physician or pharmacist before resuming medications.)   __ Eat a soft diet today.  Avoid foods that are poorly digested for the next 24 hours.  These foods would include: nuts, beans, lettuce, red meats, and fried foods. Start with liquids and advance your diet as tolerated, gradually work up to eating solids.   __ Do not have a Barium Study or Enema for one week.    Your physician recommends the additional following instructions:    -You have a contact number available for emergencies. The signs and symptoms of potential delayed complications were discussed with you. You may return to normal activities tomorrow.  -Resume your previous diet.  -Continue your present medications.   -We are waiting for your pathology results.  -Your physician has recommended a repeat colonoscopy (date to be determined after pending pathology results are reviewed) for surveillance based on pathology results.  -The findings and recommendations have been discussed with you.  -The findings and recommendations were discussed with your family.  - Please see Medication Reconciliation Form for new medication/medications prescribed.       If you experience any problems or have any questions following discharge from the GI Lab, please call:        Nurse Signature                                                                        Date___________________                                                                            Patient/Responsible Party Signature                                        Date___________________

## 2024-06-12 NOTE — PROGRESS NOTES
"   06/12/24 1147   Discharge Planning   Patient expects to be discharged to: Received referral from triage RN re: concerns about pt ablity to answer questions correctly about when he last took his medications and when the last time he eat before his colonscopy procedure today. MARY met with pt wife in waitng room area who drove pt to procedure. Wife states that pt has a pill box and organizes his own medications. Wife states that pt is running out of medications at the appropriate time for refills. SW asked permission to call pt children to see if additional resouces were needed for pt. Wife declines futher SW intervention stating pt \"is fine, really. they don't need to be bothered.\"         "

## 2024-06-19 LAB
LABORATORY COMMENT REPORT: NORMAL
PATH REPORT.FINAL DX SPEC: NORMAL
PATH REPORT.GROSS SPEC: NORMAL
PATH REPORT.TOTAL CANCER: NORMAL

## 2024-06-20 ENCOUNTER — TELEPHONE (OUTPATIENT)
Dept: GASTROENTEROLOGY | Facility: CLINIC | Age: 79
End: 2024-06-20
Payer: MEDICARE

## 2024-06-20 NOTE — TELEPHONE ENCOUNTER
----- Message from Leona Baca MD sent at 6/19/2024  2:58 PM EDT -----  Several polyps removed during colonoscopy  Path report on colon polyps-precancerous polyp called tubular adenoma.  Next colonoscopy must be in 3 years.    Please inform patient

## 2024-06-25 DIAGNOSIS — K90.89 BILE SALT-INDUCED DIARRHEA (HHS-HCC): ICD-10-CM

## 2024-06-26 RX ORDER — DICYCLOMINE HYDROCHLORIDE 20 MG/1
TABLET ORAL
Qty: 90 TABLET | Refills: 1 | Status: SHIPPED | OUTPATIENT
Start: 2024-06-26

## 2024-09-09 ENCOUNTER — OFFICE VISIT (OUTPATIENT)
Dept: PAIN MEDICINE | Facility: CLINIC | Age: 79
End: 2024-09-09
Payer: MEDICARE

## 2024-09-09 VITALS
OXYGEN SATURATION: 97 % | HEART RATE: 68 BPM | RESPIRATION RATE: 16 BRPM | SYSTOLIC BLOOD PRESSURE: 141 MMHG | DIASTOLIC BLOOD PRESSURE: 78 MMHG

## 2024-09-09 DIAGNOSIS — M15.9 GENERALIZED OSTEOARTHRITIS OF MULTIPLE SITES: Primary | ICD-10-CM

## 2024-09-09 DIAGNOSIS — M54.16 BILATERAL LUMBAR RADICULOPATHY: ICD-10-CM

## 2024-09-09 DIAGNOSIS — M47.816 SPONDYLOSIS OF LUMBAR REGION WITHOUT MYELOPATHY OR RADICULOPATHY: ICD-10-CM

## 2024-09-09 DIAGNOSIS — Z79.891 USE OF OPIATES FOR THERAPEUTIC PURPOSES: ICD-10-CM

## 2024-09-09 PROCEDURE — 1125F AMNT PAIN NOTED PAIN PRSNT: CPT | Performed by: NURSE PRACTITIONER

## 2024-09-09 PROCEDURE — 3077F SYST BP >= 140 MM HG: CPT | Performed by: NURSE PRACTITIONER

## 2024-09-09 PROCEDURE — 1160F RVW MEDS BY RX/DR IN RCRD: CPT | Performed by: NURSE PRACTITIONER

## 2024-09-09 PROCEDURE — 99214 OFFICE O/P EST MOD 30 MIN: CPT | Performed by: NURSE PRACTITIONER

## 2024-09-09 PROCEDURE — 1036F TOBACCO NON-USER: CPT | Performed by: NURSE PRACTITIONER

## 2024-09-09 PROCEDURE — 1159F MED LIST DOCD IN RCRD: CPT | Performed by: NURSE PRACTITIONER

## 2024-09-09 PROCEDURE — 3078F DIAST BP <80 MM HG: CPT | Performed by: NURSE PRACTITIONER

## 2024-09-09 RX ORDER — HYDROCODONE BITARTRATE AND ACETAMINOPHEN 7.5; 325 MG/1; MG/1
1 TABLET ORAL 2 TIMES DAILY PRN
Qty: 56 TABLET | Refills: 0 | Status: SHIPPED | OUTPATIENT
Start: 2024-11-04 | End: 2024-12-02

## 2024-09-09 RX ORDER — HYDROCODONE BITARTRATE AND ACETAMINOPHEN 7.5; 325 MG/1; MG/1
1 TABLET ORAL 2 TIMES DAILY
Qty: 56 TABLET | Refills: 0 | Status: SHIPPED | OUTPATIENT
Start: 2024-09-09 | End: 2024-10-07

## 2024-09-09 RX ORDER — NALOXONE HYDROCHLORIDE 4 MG/.1ML
1 SPRAY NASAL AS NEEDED
Qty: 2 EACH | Refills: 0 | Status: SHIPPED | OUTPATIENT
Start: 2024-09-09

## 2024-09-09 RX ORDER — HYDROCODONE BITARTRATE AND ACETAMINOPHEN 7.5; 325 MG/1; MG/1
1 TABLET ORAL 2 TIMES DAILY PRN
Qty: 56 TABLET | Refills: 0 | Status: SHIPPED | OUTPATIENT
Start: 2024-10-07 | End: 2024-11-04

## 2024-09-09 ASSESSMENT — PAIN DESCRIPTION - DESCRIPTORS: DESCRIPTORS: ACHING;SHARP

## 2024-09-09 ASSESSMENT — ENCOUNTER SYMPTOMS
BACK PAIN: 0
CONSTITUTIONAL NEGATIVE: 1
HEMATOLOGIC/LYMPHATIC NEGATIVE: 1
PALPITATIONS: 0
ALLERGIC/IMMUNOLOGIC NEGATIVE: 1
RESPIRATORY NEGATIVE: 1
GASTROINTESTINAL NEGATIVE: 1
WEAKNESS: 1
NUMBNESS: 1
PSYCHIATRIC NEGATIVE: 1
JOINT SWELLING: 0
MYALGIAS: 0
NECK STIFFNESS: 1
ENDOCRINE NEGATIVE: 1
EYES NEGATIVE: 1
NECK PAIN: 1
ARTHRALGIAS: 0

## 2024-09-09 ASSESSMENT — PAIN SCALES - GENERAL
PAINLEVEL: 4
PAINLEVEL_OUTOF10: 4

## 2024-09-09 ASSESSMENT — LIFESTYLE VARIABLES: TOTAL SCORE: 0

## 2024-09-09 ASSESSMENT — PAIN - FUNCTIONAL ASSESSMENT: PAIN_FUNCTIONAL_ASSESSMENT: 0-10

## 2024-09-09 NOTE — PROGRESS NOTES
"Subjective   Patient ID: Leonides Diana \"Roddy\" is a 78 y.o. male who presents for Back Pain.  HPI    Roddy follows up for interval reevaluation of his low back pain from lumbar degenerative disc with radiculitis at L4-L5, lumbar stenosis at L3-L4, chronic left hip pain, bilateral knee pain from osteoarthritis. 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 History of right total knee arthroplasty 11/14/18. Revision of the right knee 4/23/19.      Over the last 6 weeks Roddy is with increased right-sided low back and right-sided posterior hip and thigh pain that can be as high as a 7-8 out of a 10 with weightbearing activity. When sitting it feels like he sitting on a nail to the right buttocks. Does experience subjective weakness and numbness in the same pain pattern.     History of right-sided L5 transforaminal lumbar epidural steroid injection November 16, 2023 did not help very much to reduce pain or improve function.     Roddy is in a comprehensive pain management program. Continues with physical therapy exercises daily as tolerated for his low back, hips and knees and will continue to do so as long as they reduce pain and improve function. Practices activity modification. Takes medication as prescribed. In addition to prescribed medication also takes arthritis strength Tylenol throughout the day for additional coverage.     Norco 7.5 mg twice daily reduce his pain improves function up to 70%. Pain is a 4-5 out of 10 with medication. He denies major side effect from medication.      Able to manage ADLs with improved function with medication. Has below average quality family and social likely current condition treatment.      Toxicology consistent November 30, 2022.  Toxicology today.  Annual controlled substance agreement and opioid risk tool are completed and scanned into the chart September 9, 2024.    For continuity:   Given the patient's report of reduced pain and improved " functional ability without adverse effects, it is reasonable to treat with narcotic medications. The terms of the opioid agreement as well as the potential risks and adverse effects of the patient's medication regimen were discussed in detail. This includes if applicable due to dosage of medication permission to discuss and coordinate care with other treatment providers relevant to the patients condition. The patient verbalized understanding.     Risks and side effects of chronic opioid therapy including but not limited to tolerance, dependence, constipation, hyperalgesia, cognitive side effects, addiction and possible death due to overuse and or misuse were discussed. I also discussed that such medications when co-administered with other sedative agents including but not limited to alcohol, benzodiazepines, sedative hypnotics and illegal drugs could pose life threatening consequences including death. I also explained the impact that the administration of such medication has on a patient with obstructive sleep apnea and continued recommendations for use of apnea devices if ordered are prescribed by other physicians. In order to effectively and safely treat the pain, I also emphasized the importance of compliance with the treatment plan, as well as compliance with the terms of the opioid agreement, which was reviewed in detail. I explained the importance of being responsible with the medications and to take these only as prescribed, never in excess and never for reasons other than pain reduction. The patient was counseled on keeping the medications safe and locked away from children and other adults as well as disposal methods and options. The patient understood the risks and instructions.     I also discussed with the patient in detail that based on the clinical response to the opioid medications and improvements of activities of daily living, sleep, and work performance in light of compliance with the treatment  plan we can continue this form of therapy for the above chronic pain. The goal and rationale used for current treatment with chronic opioid medication is to control the pain and alleviate disability induced by the chronic pain condition noted above after failures of other non-opioid and nonpharmacological modalities to treat the chronic pain and the symptoms associated with have failed. The patient understood the goals in terms of the above treatment plan and had no further questions prior to leaving the office today.     Of note, the above-mentioned diagnoses/conditions and expected fluctuating nature of pain, and pain characteristic changes may lead to prolonged functional impairment requiring frequent and multiple reassessments with continued high level medical decision making. As noted, medication and medication management may require opiate therapy in excess of a routine less than 30 day medication requirement. The patient may require daily opiate therapy necessitating month-long prescription medication as noted above in order to perform activities of daily living and achieve acceptable quality of life with respect to their chronic pain condition for the foreseeable future. We monitor our patient's carefully through drug monitoring, medication counts, urine drug testing specific to their medication as well as a myriad of other substances and with frequent follow-ups with interval reassement of the chronic pain condition, its pathophysiology and prognosis.     The level of clinical decision making at this office visit is high due to high risks and complications including mortality and morbidity related to acute and chronic pain with respects to life, bodily function, and treatment. Risks and clinical decisions with respect to under treatment, failure to maintain adequate treatment, and/or overtreatment complications and outcomes were discussed with the patient with respect to their chronic pain conditions,  interventional therapies, as well as the use of various medications including possible controlled/dangerous medications. The amount and complexity of reviewed data at this in subsequent office visits is high given patient's fluctuating clinical presentation, laboratory and radiographic reports, prescription monitoring program data, and medication history as well as other relevant data as noted above. Pertinent negatives and positives data was used in consideration for the above-mentioned high complexity.      Given the patient's total MED, general use of daily opiates, or other coadministered medications in various classes the patient was offered a prescription for Narcan. I instructed the patient that it is important that patient fill this medication in order to demonstrate understanding of the gravity of possible side effects including respiratory depression and risk of overdose of this opiate load or medication combination. As such patient will be required to bring Narcan prescription to follow-up appointments as part of compliance with continued opiate care.     With respect to opiate induced constipation I discussed multiple ways to combat this problem including staying hydrated and taking over-the-counter medications such as Dulcolax, Miralax and Senna. If these treatments are not effective we could consider such medications as Amitiza, Linzess and Movantik.     Disclaimer: This note was transcribed using an audio transcription device. As such, minor errors may be present with regard to spelling, punctuation, and inadvertent word insertion. Please disregard such errors.    Narrative & Impression   Interpreted By:  Zack Olivier,   STUDY:  XR HIP RIGHT WITH PELVIS WHEN PERFORMED 2 OR 3 VIEWS; XR HIP LEFT  WITH PELVIS WHEN PERFORMED 2 OR 3 VIEWS; ;  1/8/2024 8:54 am      INDICATION:  Signs/Symptoms:pain.      COMPARISON:  None.      ACCESSION NUMBER(S):  [id number]; [id number]      ORDERING  CLINICIAN:  RODDY DEGROOT      FINDINGS:  Bilateral total hip arthroplasty. No evidence of hardware fracture or  abnormal perihardware lucency. Multilevel degenerative change in the  spine.      IMPRESSION:  Intact bilateral total hip arthroplasty.          MACRO:  None      Signed by: Zack Olivier 1/9/2024 11:01 AM  Dictation workstation:   [id number]     Narrative & Impression  MRN: [id number]  Patient Name: TREVOR REARDON     STUDY:  MRI L-SPINE WO;  12/2/2022 6:24 pm     INDICATION:  back pain,leg pain  M48.061: Lumbar stenosis   PT HAS BOTH KNEES AND  BOTH HIPS REPLACED.     COMPARISON:  MRI of the lumbar spine dated 09/26/2011     ACCESSION NUMBER(S):  [id number]     ORDERING CLINICIAN:  JOEL HARRINGTON     TECHNIQUE:  Sagittal T1, T2, STIR, axial T1 and T2 weighted images of the lumbar  spine were acquired.     FINDINGS:  There are 5 lumbar type non rib-bearing vertebral bodies, with lowest  well-formed intervertebral disc space labeled L5-S1.     Multilevel spondylolisthesis of the lumbar spine is present,  including a 3-4 mm retrolisthesis of L1 on L2, 4-5 mm anterolisthesis  of L3 on L4, and a 5-6 mm anterolisthesis of L5 on S1, similar in  appearance to prior exam in 2011. There is again evidence of  bilateral pars articularis defects at L5.     Mild insufficiency endplate changes are present at several levels,  without associated compression fractures. Posterior elements of the  lumbar spine do not demonstrate evidence of acute trauma.     There is multilevel intervertebral disc height loss, mild-to-moderate  at L2-L3 and L3-L4, and moderate to severe at L5-S1.     Visualized lower thoracic spinal cord terminates at L2-L3 in  demonstrates no discrete signal abnormality.     T12-L1: No significant posterior disc contour abnormality is present.  No spinal canal or neural foraminal stenosis.     L1-L2: Mild disc bulge, hypertrophic facet changes and ligamentum  flavum thickening slightly deform the  subarachnoid space, without  significant spinal canal stenosis. Mild bilateral neural foraminal  stenosis is present due to hypertrophic facet changes and bulging  disc material.     L2-L3: Combination of bulging disc, hypertrophic facet changes and  ligamentum flavum thickening deform the subarachnoid space and cause  mild spinal canal narrowing, similar to slightly worsened in  appearance compared to prior exam in 2011, with mild bilateral neural  foraminal stenosis due to bulging disc material, also slightly  worsened since prior exam.     L3-L4: Combination of spondylolisthesis, circumferential disc bulge,  hypertrophic facet changes and ligamentum flavum thickening cause  moderate to severe spinal canal stenosis, similar to slightly  worsened in appearance since prior exam in 2011, with near complete  effacement of the subarachnoid space. Moderate to severe bilateral  neural foraminal stenosis appears slightly worsened since prior exam.     L4-L5: No significant posterior disc contour abnormality is present.  There is no significant spinal canal stenosis. There is mild  bilateral neural foraminal narrowing due to hypertrophic facet  changes and para foraminal disc bulge, similar in appearance to prior  study.     L5-S1: Combination of spondylolisthesis and uncovered disc material  do not cause significant spinal canal narrowing, although there is  moderate to severe bilateral neural foraminal stenosis due to  spondylolisthesis and hypertrophic facet changes, similar in  appearance to prior exam.     Multilevel degenerate facet osteoarthropathy is present at nearly  every level of the lumbar spine, most pronounced at L4-5 and L5-S1  bilaterally.     There is diffuse mild fatty atrophy of the paraspinal musculature.  Mild STIR hyperintense edema is present in the paraspinal musculature  surrounding the L4-5 facets bilaterally.     IMPRESSION:  1.  Multilevel degenerative changes of the lumbar spine are  markedly  worsened in appearance compared to prior exam in 2011, including  slight worsening of moderate to severe spinal canal stenosis at the  level of L3-L4 due to combination of spondylolisthesis, uncovered  disc material, hypertrophic facet changes and ligamentum flavum  thickening. Additional multilevel varying degrees of spinal canal or  neural foraminal stenosis are detailed above.  2. Redemonstration of anterolisthesis of L5 on S1 with bilateral pars  articularis defects of L5, similar in appearance to prior exam in  2011.    Review of Systems   Constitutional: Negative.    HENT: Negative.     Eyes: Negative.    Respiratory: Negative.     Cardiovascular:  Positive for leg swelling. Negative for chest pain and palpitations.   Gastrointestinal: Negative.    Endocrine: Negative.    Genitourinary: Negative.    Musculoskeletal:  Positive for neck pain and neck stiffness. Negative for arthralgias, back pain, gait problem, joint swelling and myalgias.   Skin: Negative.    Allergic/Immunologic: Negative.    Neurological:  Positive for weakness and numbness.   Hematological: Negative.    Psychiatric/Behavioral: Negative.         Objective   Physical Exam  Vitals and nursing note reviewed.   Constitutional:       Appearance: Normal appearance.   HENT:      Head: Normocephalic and atraumatic.   Eyes:      Conjunctiva/sclera: Conjunctivae normal.   Cardiovascular:      Pulses: Normal pulses.   Pulmonary:      Effort: Pulmonary effort is normal. No respiratory distress.   Musculoskeletal:      Right lower leg: Edema present.      Left lower leg: Edema present.      Comments: +2 pitting edema to ankles and pretibial areas.   Skin:     General: Skin is warm and dry.      Capillary Refill: Capillary refill takes 2 to 3 seconds.   Neurological:      Mental Status: He is alert and oriented to person, place, and time.      Cranial Nerves: No cranial nerve deficit.      Sensory: No sensory deficit.      Motor: Weakness  present.      Gait: Gait abnormal.      Comments: Weakness with right ankle dorsiflexion.    Negative straight leg raise.  No clonus.    Ambulates with mildly antalgic gait.   Psychiatric:         Mood and Affect: Mood normal.         Behavior: Behavior normal.         Assessment/Plan   Problem List Items Addressed This Visit             ICD-10-CM    Generalized osteoarthritis of multiple sites - Primary M15.9    Relevant Medications    HYDROcodone-acetaminophen (Norco) 7.5-325 mg tablet    HYDROcodone-acetaminophen (Norco) 7.5-325 mg tablet (Start on 10/7/2024)    HYDROcodone-acetaminophen (Norco) 7.5-325 mg tablet (Start on 11/4/2024)    Spondylosis of lumbar region without myelopathy or radiculopathy M47.816    Relevant Medications    HYDROcodone-acetaminophen (Norco) 7.5-325 mg tablet    HYDROcodone-acetaminophen (Norco) 7.5-325 mg tablet (Start on 10/7/2024)    HYDROcodone-acetaminophen (Norco) 7.5-325 mg tablet (Start on 11/4/2024)    Bilateral lumbar radiculopathy M54.16    Relevant Medications    HYDROcodone-acetaminophen (Norco) 7.5-325 mg tablet    HYDROcodone-acetaminophen (Norco) 7.5-325 mg tablet (Start on 10/7/2024)    HYDROcodone-acetaminophen (Norco) 7.5-325 mg tablet (Start on 11/4/2024)     Other Visit Diagnoses         Codes    Use of opiates for therapeutic purposes     Z79.891    Relevant Medications    naloxone (Narcan) 4 mg/0.1 mL nasal spray             Follow-up 12 weeks.    DANA Danielle 09/09/24 11:34 AM

## 2024-09-17 DIAGNOSIS — E03.9 ADULT HYPOTHYROIDISM: ICD-10-CM

## 2024-09-17 RX ORDER — LEVOTHYROXINE SODIUM 75 UG/1
75 TABLET ORAL DAILY
Qty: 90 TABLET | Refills: 0 | Status: SHIPPED | OUTPATIENT
Start: 2024-09-17

## 2024-10-07 ENCOUNTER — OFFICE VISIT (OUTPATIENT)
Dept: PRIMARY CARE | Facility: CLINIC | Age: 79
End: 2024-10-07
Payer: MEDICARE

## 2024-10-07 VITALS
WEIGHT: 259 LBS | HEIGHT: 65 IN | BODY MASS INDEX: 43.15 KG/M2 | TEMPERATURE: 98.8 F | HEART RATE: 79 BPM | OXYGEN SATURATION: 93 % | SYSTOLIC BLOOD PRESSURE: 120 MMHG | DIASTOLIC BLOOD PRESSURE: 78 MMHG

## 2024-10-07 DIAGNOSIS — N32.0 BLADDER-NECK OBSTRUCTION: ICD-10-CM

## 2024-10-07 DIAGNOSIS — M54.2 NECK PAIN: ICD-10-CM

## 2024-10-07 DIAGNOSIS — I48.11 LONGSTANDING PERSISTENT ATRIAL FIBRILLATION (MULTI): ICD-10-CM

## 2024-10-07 DIAGNOSIS — E66.01 MORBID OBESITY (MULTI): ICD-10-CM

## 2024-10-07 DIAGNOSIS — F33.0 MILD EPISODE OF RECURRENT MAJOR DEPRESSIVE DISORDER (CMS-HCC): ICD-10-CM

## 2024-10-07 DIAGNOSIS — E03.9 ADULT HYPOTHYROIDISM: ICD-10-CM

## 2024-10-07 DIAGNOSIS — G47.33 OBSTRUCTIVE SLEEP APNEA: ICD-10-CM

## 2024-10-07 DIAGNOSIS — N39.41 URGE INCONTINENCE OF URINE: ICD-10-CM

## 2024-10-07 DIAGNOSIS — I10 BENIGN HYPERTENSION: ICD-10-CM

## 2024-10-07 DIAGNOSIS — N30.90 CYSTITIS: Primary | ICD-10-CM

## 2024-10-07 PROBLEM — Z86.39 HISTORY OF ELEVATED LIPIDS: Status: RESOLVED | Noted: 2024-02-07 | Resolved: 2024-10-07

## 2024-10-07 PROBLEM — R42 DIZZINESS: Status: RESOLVED | Noted: 2024-02-07 | Resolved: 2024-10-07

## 2024-10-07 PROBLEM — Z86.718 HISTORY OF DEEP VENOUS THROMBOSIS: Status: RESOLVED | Noted: 2022-07-27 | Resolved: 2024-10-07

## 2024-10-07 PROBLEM — G89.29 CHRONIC PAIN: Status: RESOLVED | Noted: 2024-02-07 | Resolved: 2024-10-07

## 2024-10-07 PROBLEM — I25.2 HISTORY OF MYOCARDIAL INFARCTION: Status: RESOLVED | Noted: 2024-02-07 | Resolved: 2024-10-07

## 2024-10-07 PROBLEM — S70.12XA HEMATOMA OF LEFT THIGH: Status: RESOLVED | Noted: 2023-02-24 | Resolved: 2024-10-07

## 2024-10-07 PROBLEM — R19.7 DIARRHEA: Status: RESOLVED | Noted: 2024-02-07 | Resolved: 2024-10-07

## 2024-10-07 PROBLEM — Z86.16 PERSONAL HISTORY OF COVID-19: Status: RESOLVED | Noted: 2023-02-27 | Resolved: 2024-10-07

## 2024-10-07 PROBLEM — Z86.79 HISTORY OF ENDOCARDITIS: Status: RESOLVED | Noted: 2024-02-07 | Resolved: 2024-10-07

## 2024-10-07 PROBLEM — K59.00 CONSTIPATION: Status: RESOLVED | Noted: 2024-02-07 | Resolved: 2024-10-07

## 2024-10-07 PROBLEM — R51.9 HEADACHE: Status: RESOLVED | Noted: 2024-02-07 | Resolved: 2024-10-07

## 2024-10-07 PROBLEM — M79.10 MUSCLE PAIN: Status: RESOLVED | Noted: 2024-02-07 | Resolved: 2024-10-07

## 2024-10-07 PROBLEM — R10.30 LOWER ABDOMINAL PAIN: Status: RESOLVED | Noted: 2023-03-03 | Resolved: 2024-10-07

## 2024-10-07 PROBLEM — Z96.649 HISTORY OF TOTAL HIP REPLACEMENT: Status: RESOLVED | Noted: 2024-02-07 | Resolved: 2024-10-07

## 2024-10-07 PROBLEM — Z86.69 HISTORY OF VISUAL DISTURBANCE: Status: RESOLVED | Noted: 2024-02-07 | Resolved: 2024-10-07

## 2024-10-07 PROBLEM — M89.9 DISORDER OF BONE: Status: RESOLVED | Noted: 2024-02-07 | Resolved: 2024-10-07

## 2024-10-07 PROBLEM — K59.04 CHRONIC IDIOPATHIC CONSTIPATION: Status: RESOLVED | Noted: 2024-02-19 | Resolved: 2024-10-07

## 2024-10-07 PROBLEM — Z86.711 HISTORY OF PULMONARY EMBOLISM: Status: RESOLVED | Noted: 2022-07-27 | Resolved: 2024-10-07

## 2024-10-07 PROBLEM — F32.A DEPRESSIVE DISORDER: Status: RESOLVED | Noted: 2023-03-03 | Resolved: 2024-10-07

## 2024-10-07 PROBLEM — R53.1 WEAKNESS GENERALIZED: Status: RESOLVED | Noted: 2023-03-03 | Resolved: 2024-10-07

## 2024-10-07 LAB
POC APPEARANCE, URINE: ABNORMAL
POC BILIRUBIN, URINE: NEGATIVE
POC BLOOD, URINE: ABNORMAL
POC COLOR, URINE: YELLOW
POC GLUCOSE, URINE: NEGATIVE MG/DL
POC KETONES, URINE: NEGATIVE MG/DL
POC LEUKOCYTES, URINE: ABNORMAL
POC NITRITE,URINE: POSITIVE
POC PH, URINE: 6 PH
POC PROTEIN, URINE: NEGATIVE MG/DL
POC SPECIFIC GRAVITY, URINE: 1.02
POC UROBILINOGEN, URINE: 1 EU/DL

## 2024-10-07 PROCEDURE — G2211 COMPLEX E/M VISIT ADD ON: HCPCS | Performed by: FAMILY MEDICINE

## 2024-10-07 PROCEDURE — 1036F TOBACCO NON-USER: CPT | Performed by: FAMILY MEDICINE

## 2024-10-07 PROCEDURE — 3078F DIAST BP <80 MM HG: CPT | Performed by: FAMILY MEDICINE

## 2024-10-07 PROCEDURE — 87086 URINE CULTURE/COLONY COUNT: CPT

## 2024-10-07 PROCEDURE — 99214 OFFICE O/P EST MOD 30 MIN: CPT | Performed by: FAMILY MEDICINE

## 2024-10-07 PROCEDURE — 3074F SYST BP LT 130 MM HG: CPT | Performed by: FAMILY MEDICINE

## 2024-10-07 PROCEDURE — 1160F RVW MEDS BY RX/DR IN RCRD: CPT | Performed by: FAMILY MEDICINE

## 2024-10-07 PROCEDURE — 81003 URINALYSIS AUTO W/O SCOPE: CPT | Performed by: FAMILY MEDICINE

## 2024-10-07 PROCEDURE — 87186 SC STD MICRODIL/AGAR DIL: CPT

## 2024-10-07 PROCEDURE — 1159F MED LIST DOCD IN RCRD: CPT | Performed by: FAMILY MEDICINE

## 2024-10-07 RX ORDER — SULFAMETHOXAZOLE AND TRIMETHOPRIM 800; 160 MG/1; MG/1
1 TABLET ORAL 2 TIMES DAILY
Qty: 28 TABLET | Refills: 0 | Status: SHIPPED | OUTPATIENT
Start: 2024-10-07 | End: 2024-10-21

## 2024-10-07 ASSESSMENT — PATIENT HEALTH QUESTIONNAIRE - PHQ9
1. LITTLE INTEREST OR PLEASURE IN DOING THINGS: NOT AT ALL
2. FEELING DOWN, DEPRESSED OR HOPELESS: SEVERAL DAYS
SUM OF ALL RESPONSES TO PHQ9 QUESTIONS 1 AND 2: 1

## 2024-10-07 ASSESSMENT — ENCOUNTER SYMPTOMS
SHORTNESS OF BREATH: 0
RHINORRHEA: 1
SORE THROAT: 0

## 2024-10-07 NOTE — PATIENT INSTRUCTIONS
Recommend frequent fluid intake, you can take over-the-counter medications for symptom relief.  We will send urine for culture to confirm the infection and notify when results come back usually within 2-3 days.  Call if your symptoms worsen, especially fevers, worsening back pain, nausea, vomiting or diarrhea.    Ultrasound  bladder, concern for bladder outlet obstruction    Get INR checked later this week     Get your blood work as ordered.  You should hear from our office with results whether they are normal are not within a few days.  Please call the office if you do not hear from us.

## 2024-10-07 NOTE — PROGRESS NOTES
"Subjective   Patient ID: Roddy Diana is a 78 y.o. male who presents for UTI.  Sx's onset yesterday; frequency and burning; foul odor and accidents.      HPI  Increased frequency, urgency, and burning. Going every 5 min. Unable to urinate fully. Denies blood or discharge but has foul smell. Denies catheter use. Using a pad but soaked through. Endorses chills a few nights ago.    Pain when turning neck. Feels popping and snapping. Pain in posterior neck and tops of shoulders. Has been there for 2-3 yrs but worse in last week. Feels better with warm shower. Takes hydrocodone-acetaminophen when needed, which helps the pain.    Feels overwhelmed with caretaker responsibilities and finances. Unsure if venlafaxine is helping because doesn't remember what it was like before taking med. 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     Review of Systems   Constitutional:  Positive for chills.        Chills a few nights ago before urinary symptoms began.   HENT:  Positive for rhinorrhea. Negative for sore throat.         Has runny nose all the time.   Respiratory:  Negative for shortness of breath.    Gastrointestinal:         Feels like \"fold in guts\". Increased gas when bends forward. Improved since surgery.   Genitourinary:  Positive for flank pain.   Musculoskeletal:         Neck feels like it is popping and snapping.   All other systems reviewed and are negative.      Objective   /78   Pulse 79   Temp 37.1 °C (98.8 °F)   Ht 1.651 m (5' 5\")   Wt 117 kg (259 lb)   SpO2 93%   BMI 43.10 kg/m²       Physical Exam  Constitutional:       Appearance: Normal appearance. He is well-developed.   Cardiovascular:      Rate and Rhythm: Normal rate and regular rhythm.      Heart sounds: Normal heart sounds. No murmur heard.  Pulmonary:      Effort: Pulmonary effort is normal.      Breath sounds: Normal breath sounds.   Abdominal:      General: Abdomen is flat.      Palpations: Abdomen is soft.   Genitourinary:     Comments: " L flank pain.  Neurological:      General: No focal deficit present.      Mental Status: He is alert.       Assessment/Plan   Problem List Items Addressed This Visit             ICD-10-CM    Adult hypothyroidism E03.9    Relevant Orders    US bladder    Comprehensive Metabolic Panel    CBC and Auto Differential    Atrial fibrillation (Multi) I48.91    Relevant Orders    US bladder    Comprehensive Metabolic Panel    CBC and Auto Differential    Benign hypertension I10    Relevant Orders    US bladder    Comprehensive Metabolic Panel    CBC and Auto Differential    Major depression, recurrent (CMS-HCC) F33.9    Relevant Orders    US bladder    Comprehensive Metabolic Panel    CBC and Auto Differential    Morbid obesity (Multi) E66.01    Relevant Orders    US bladder    Comprehensive Metabolic Panel    CBC and Auto Differential    Obstructive sleep apnea G47.33    Relevant Orders    US bladder    Comprehensive Metabolic Panel    CBC and Auto Differential    Incontinence R32    Relevant Orders    US bladder    Comprehensive Metabolic Panel    CBC and Auto Differential    Prostate Specific Antigen, Screen     Other Visit Diagnoses         Codes    Cystitis    -  Primary N30.90    Relevant Medications    sulfamethoxazole-trimethoprim (Bactrim DS) 800-160 mg tablet    Other Relevant Orders    POCT UA Automated manually resulted    Urine Culture    US bladder    Comprehensive Metabolic Panel    CBC and Auto Differential    Prostate Specific Antigen, Screen    Neck pain     M54.2    Relevant Orders    US bladder    Comprehensive Metabolic Panel    CBC and Auto Differential    Bladder-neck obstruction     N32.0    Relevant Orders    Prostate Specific Antigen, Screen

## 2024-10-07 NOTE — PROGRESS NOTES
"Subjective   Patient ID: Roddy Diana is a 78 y.o. male who presents for UTI.  Sx's onset yesterday; frequency and burning; foul odor and accidents.   Frequency and burning of urination  Incontinence     Chronic neck pain   Getting narcotics   Pain is worse over last week       Atrial fibrillation, coronary artery disease:, chronic, on blood thinners, sees cardiology, Dr Oscar mack, no palpitations, chest pain,         Major depressive disorder, mood ok  on venlafaxine  Some stress at home      Lumbar radiculopathy: Seeing pain management and getting injections     Hypothyroidism, stable energy level     BPH: On finasteride, urinating ok      Moles on back  ,itch and painful  , no bleeding      Obstructive sleep apnea, using CPAP           Review of Systems    Objective   /78   Pulse 79   Temp 37.1 °C (98.8 °F)   Ht 1.651 m (5' 5\")   Wt 117 kg (259 lb)   SpO2 93%   BMI 43.10 kg/m²     Physical Exam  Constitutional:       Appearance: Normal appearance. He is well-developed.   Cardiovascular:      Rate and Rhythm: Normal rate and regular rhythm.      Heart sounds: Normal heart sounds. No murmur heard.  Pulmonary:      Effort: Pulmonary effort is normal.      Breath sounds: Normal breath sounds.   Neurological:      General: No focal deficit present.      Mental Status: He is alert.     No CVAT  Mild suprapubic tenderness  Incontinence    Assessment/Plan   Problem List Items Addressed This Visit             ICD-10-CM    Adult hypothyroidism E03.9    Relevant Orders    US bladder    Comprehensive Metabolic Panel    CBC and Auto Differential    Atrial fibrillation (Multi) I48.91    Relevant Orders    US bladder    Comprehensive Metabolic Panel    CBC and Auto Differential    Benign hypertension I10    Relevant Orders    US bladder    Comprehensive Metabolic Panel    CBC and Auto Differential    Major depression, recurrent (CMS-HCC) F33.9    Relevant Orders    US bladder    Comprehensive Metabolic Panel    CBC " and Auto Differential    Morbid obesity (Multi) E66.01    Relevant Orders    US bladder    Comprehensive Metabolic Panel    CBC and Auto Differential    Obstructive sleep apnea G47.33    Relevant Orders    US bladder    Comprehensive Metabolic Panel    CBC and Auto Differential    Incontinence R32    Relevant Orders    US bladder    Comprehensive Metabolic Panel    CBC and Auto Differential    Prostate Specific Antigen, Screen     Other Visit Diagnoses         Codes    Cystitis    -  Primary N30.90    Relevant Medications    sulfamethoxazole-trimethoprim (Bactrim DS) 800-160 mg tablet    Other Relevant Orders    POCT UA Automated manually resulted    Urine Culture    US bladder    Comprehensive Metabolic Panel    CBC and Auto Differential    Prostate Specific Antigen, Screen    Neck pain     M54.2    Relevant Orders    US bladder    Comprehensive Metabolic Panel    CBC and Auto Differential    Bladder-neck obstruction     N32.0    Relevant Orders    Prostate Specific Antigen, Screen

## 2024-10-08 ENCOUNTER — HOSPITAL ENCOUNTER (OUTPATIENT)
Dept: RADIOLOGY | Facility: HOSPITAL | Age: 79
Discharge: HOME | End: 2024-10-08
Payer: MEDICARE

## 2024-10-08 ENCOUNTER — TELEPHONE (OUTPATIENT)
Dept: PRIMARY CARE | Facility: CLINIC | Age: 79
End: 2024-10-08

## 2024-10-08 DIAGNOSIS — E66.01 MORBID OBESITY (MULTI): ICD-10-CM

## 2024-10-08 DIAGNOSIS — G47.33 OBSTRUCTIVE SLEEP APNEA: ICD-10-CM

## 2024-10-08 DIAGNOSIS — N39.41 URGE INCONTINENCE OF URINE: ICD-10-CM

## 2024-10-08 DIAGNOSIS — M54.2 NECK PAIN: ICD-10-CM

## 2024-10-08 DIAGNOSIS — I10 BENIGN HYPERTENSION: ICD-10-CM

## 2024-10-08 DIAGNOSIS — N30.90 CYSTITIS: ICD-10-CM

## 2024-10-08 DIAGNOSIS — I48.11 LONGSTANDING PERSISTENT ATRIAL FIBRILLATION (MULTI): ICD-10-CM

## 2024-10-08 DIAGNOSIS — E03.9 ADULT HYPOTHYROIDISM: ICD-10-CM

## 2024-10-08 DIAGNOSIS — F33.0 MILD EPISODE OF RECURRENT MAJOR DEPRESSIVE DISORDER (CMS-HCC): ICD-10-CM

## 2024-10-08 PROCEDURE — 76857 US EXAM PELVIC LIMITED: CPT

## 2024-10-08 PROCEDURE — 76857 US EXAM PELVIC LIMITED: CPT | Performed by: RADIOLOGY

## 2024-10-08 NOTE — TELEPHONE ENCOUNTER
Pharmacy called wondering if  still wants them to fill RX for Bactrim DS because it interacts with his Coumadin

## 2024-10-10 DIAGNOSIS — N39.490 OVERFLOW INCONTINENCE OF URINE: ICD-10-CM

## 2024-10-10 DIAGNOSIS — R33.9 URINARY RETENTION: Primary | ICD-10-CM

## 2024-10-10 LAB — BACTERIA UR CULT: ABNORMAL

## 2024-10-10 NOTE — PROGRESS NOTES
Ruth Lowery MD  [address] Clinical Support Staff  Please call the patient, the urine culture is positive for infection, the antibiotic given should be treating the infection.  Please call the office if symptoms are not improving.      Pt informed   Quality 226: Preventive Care And Screening: Tobacco Use: Screening And Cessation Intervention: Patient screened for tobacco use and is an ex/non-smoker Detail Level: Detailed

## 2024-10-11 ENCOUNTER — TELEPHONE (OUTPATIENT)
Dept: PRIMARY CARE | Facility: CLINIC | Age: 79
End: 2024-10-11
Payer: MEDICARE

## 2024-10-11 NOTE — TELEPHONE ENCOUNTER
PATRICK for pt to contact office - we need to know if he can drive to other locations for his Urology referral?  How far is he willing to travel?  [place]? [place]? [place]?.....

## 2024-10-14 NOTE — TELEPHONE ENCOUNTER
Urologist was contacted by Dr Lowery.  Dr Dinh office will call pt to schedule appt to be seen tomorrow.

## 2024-10-31 ENCOUNTER — APPOINTMENT (OUTPATIENT)
Dept: GASTROENTEROLOGY | Facility: CLINIC | Age: 79
End: 2024-10-31
Payer: MEDICARE

## 2024-10-31 ENCOUNTER — TELEPHONE (OUTPATIENT)
Dept: GASTROENTEROLOGY | Facility: CLINIC | Age: 79
End: 2024-10-31

## 2024-10-31 NOTE — TELEPHONE ENCOUNTER
PT had to cancel todays appointment due to a flat tire, is he ok to wait till Jan for the fu? Or should he be double booked before then? It was a fu for after his procedure.

## 2024-11-01 ENCOUNTER — LAB (OUTPATIENT)
Dept: LAB | Facility: LAB | Age: 79
End: 2024-11-01
Payer: MEDICARE

## 2024-11-01 ENCOUNTER — OFFICE VISIT (OUTPATIENT)
Dept: PRIMARY CARE | Facility: CLINIC | Age: 79
End: 2024-11-01
Payer: MEDICARE

## 2024-11-01 VITALS
SYSTOLIC BLOOD PRESSURE: 100 MMHG | HEIGHT: 65 IN | BODY MASS INDEX: 43.32 KG/M2 | DIASTOLIC BLOOD PRESSURE: 58 MMHG | TEMPERATURE: 98.3 F | WEIGHT: 260 LBS | OXYGEN SATURATION: 96 % | HEART RATE: 68 BPM

## 2024-11-01 DIAGNOSIS — M54.2 NECK PAIN: ICD-10-CM

## 2024-11-01 DIAGNOSIS — N30.90 CYSTITIS: ICD-10-CM

## 2024-11-01 DIAGNOSIS — E03.9 ADULT HYPOTHYROIDISM: ICD-10-CM

## 2024-11-01 DIAGNOSIS — F33.0 MILD EPISODE OF RECURRENT MAJOR DEPRESSIVE DISORDER (CMS-HCC): ICD-10-CM

## 2024-11-01 DIAGNOSIS — R35.0 FREQUENCY OF URINATION: ICD-10-CM

## 2024-11-01 DIAGNOSIS — I48.11 LONGSTANDING PERSISTENT ATRIAL FIBRILLATION (MULTI): ICD-10-CM

## 2024-11-01 DIAGNOSIS — N32.0 BLADDER-NECK OBSTRUCTION: ICD-10-CM

## 2024-11-01 DIAGNOSIS — E66.01 MORBID OBESITY (MULTI): ICD-10-CM

## 2024-11-01 DIAGNOSIS — N39.41 URGE INCONTINENCE OF URINE: ICD-10-CM

## 2024-11-01 DIAGNOSIS — I10 BENIGN HYPERTENSION: ICD-10-CM

## 2024-11-01 DIAGNOSIS — R30.0 BURNING WITH URINATION: ICD-10-CM

## 2024-11-01 DIAGNOSIS — R05.9 COUGH IN ADULT PATIENT: Primary | ICD-10-CM

## 2024-11-01 DIAGNOSIS — G47.33 OBSTRUCTIVE SLEEP APNEA: ICD-10-CM

## 2024-11-01 LAB
ALBUMIN SERPL BCP-MCNC: 3.8 G/DL (ref 3.4–5)
ALP SERPL-CCNC: 50 U/L (ref 33–136)
ALT SERPL W P-5'-P-CCNC: 14 U/L (ref 10–52)
ANION GAP SERPL CALC-SCNC: 12 MMOL/L (ref 10–20)
AST SERPL W P-5'-P-CCNC: 21 U/L (ref 9–39)
BASOPHILS # BLD AUTO: 0.03 X10*3/UL (ref 0–0.1)
BASOPHILS NFR BLD AUTO: 0.7 %
BILIRUB SERPL-MCNC: 0.9 MG/DL (ref 0–1.2)
BUN SERPL-MCNC: 16 MG/DL (ref 6–23)
CALCIUM SERPL-MCNC: 8.2 MG/DL (ref 8.6–10.3)
CHLORIDE SERPL-SCNC: 101 MMOL/L (ref 98–107)
CO2 SERPL-SCNC: 27 MMOL/L (ref 21–32)
CREAT SERPL-MCNC: 0.81 MG/DL (ref 0.5–1.3)
EGFRCR SERPLBLD CKD-EPI 2021: 90 ML/MIN/1.73M*2
EOSINOPHIL # BLD AUTO: 0.04 X10*3/UL (ref 0–0.4)
EOSINOPHIL NFR BLD AUTO: 0.9 %
ERYTHROCYTE [DISTWIDTH] IN BLOOD BY AUTOMATED COUNT: 14 % (ref 11.5–14.5)
GLUCOSE SERPL-MCNC: 89 MG/DL (ref 74–99)
HCT VFR BLD AUTO: 45.5 % (ref 41–52)
HGB BLD-MCNC: 15 G/DL (ref 13.5–17.5)
IMM GRANULOCYTES # BLD AUTO: 0.03 X10*3/UL (ref 0–0.5)
IMM GRANULOCYTES NFR BLD AUTO: 0.7 % (ref 0–0.9)
LYMPHOCYTES # BLD AUTO: 1.22 X10*3/UL (ref 0.8–3)
LYMPHOCYTES NFR BLD AUTO: 28 %
MCH RBC QN AUTO: 31.5 PG (ref 26–34)
MCHC RBC AUTO-ENTMCNC: 33 G/DL (ref 32–36)
MCV RBC AUTO: 96 FL (ref 80–100)
MONOCYTES # BLD AUTO: 0.7 X10*3/UL (ref 0.05–0.8)
MONOCYTES NFR BLD AUTO: 16.1 %
NEUTROPHILS # BLD AUTO: 2.33 X10*3/UL (ref 1.6–5.5)
NEUTROPHILS NFR BLD AUTO: 53.6 %
NRBC BLD-RTO: 0 /100 WBCS (ref 0–0)
PLATELET # BLD AUTO: 114 X10*3/UL (ref 150–450)
POC APPEARANCE, URINE: ABNORMAL
POC BILIRUBIN, URINE: NEGATIVE
POC BLOOD, URINE: ABNORMAL
POC COLOR, URINE: ABNORMAL
POC GLUCOSE, URINE: NEGATIVE MG/DL
POC KETONES, URINE: NEGATIVE MG/DL
POC LEUKOCYTES, URINE: ABNORMAL
POC NITRITE,URINE: NEGATIVE
POC PH, URINE: 5.5 PH
POC PROTEIN, URINE: ABNORMAL MG/DL
POC SPECIFIC GRAVITY, URINE: 1.02
POC UROBILINOGEN, URINE: 1 EU/DL
POTASSIUM SERPL-SCNC: 4.1 MMOL/L (ref 3.5–5.3)
PROT SERPL-MCNC: 6.1 G/DL (ref 6.4–8.2)
RBC # BLD AUTO: 4.76 X10*6/UL (ref 4.5–5.9)
SODIUM SERPL-SCNC: 136 MMOL/L (ref 136–145)
TSH SERPL-ACNC: 3.68 MIU/L (ref 0.44–3.98)
WBC # BLD AUTO: 4.4 X10*3/UL (ref 4.4–11.3)

## 2024-11-01 PROCEDURE — 80053 COMPREHEN METABOLIC PANEL: CPT

## 2024-11-01 PROCEDURE — 84153 ASSAY OF PSA TOTAL: CPT

## 2024-11-01 PROCEDURE — 36415 COLL VENOUS BLD VENIPUNCTURE: CPT

## 2024-11-01 PROCEDURE — 85025 COMPLETE CBC W/AUTO DIFF WBC: CPT

## 2024-11-01 PROCEDURE — 87186 SC STD MICRODIL/AGAR DIL: CPT

## 2024-11-01 PROCEDURE — 87086 URINE CULTURE/COLONY COUNT: CPT

## 2024-11-01 PROCEDURE — 84443 ASSAY THYROID STIM HORMONE: CPT

## 2024-11-01 RX ORDER — NITROFURANTOIN 25; 75 MG/1; MG/1
100 CAPSULE ORAL 2 TIMES DAILY
Qty: 14 CAPSULE | Refills: 0 | Status: SHIPPED | OUTPATIENT
Start: 2024-11-01 | End: 2024-11-08

## 2024-11-01 RX ORDER — GUAIFENESIN 600 MG/1
1200 TABLET, EXTENDED RELEASE ORAL 2 TIMES DAILY
Qty: 56 TABLET | Refills: 0 | Status: SHIPPED | OUTPATIENT
Start: 2024-11-01 | End: 2024-11-15

## 2024-11-01 RX ORDER — ALBUTEROL SULFATE 90 UG/1
2 INHALANT RESPIRATORY (INHALATION) EVERY 6 HOURS PRN
Qty: 18 G | Refills: 2 | Status: SHIPPED | OUTPATIENT
Start: 2024-11-01

## 2024-11-01 ASSESSMENT — ENCOUNTER SYMPTOMS
FATIGUE: 1
WOUND: 0
ABDOMINAL DISTENTION: 0
FEVER: 0
NAUSEA: 0
DYSURIA: 1
DIZZINESS: 0
FREQUENCY: 1
ACTIVITY CHANGE: 1
APPETITE CHANGE: 1
CHILLS: 1
TROUBLE SWALLOWING: 0
EYE PAIN: 0
DIARRHEA: 0
JOINT SWELLING: 0
CONSTIPATION: 0
COUGH: 0
WEAKNESS: 0
SHORTNESS OF BREATH: 0
HEADACHES: 0
ABDOMINAL PAIN: 0
ADENOPATHY: 0
BACK PAIN: 0
MYALGIAS: 0
BRUISES/BLEEDS EASILY: 0
SEIZURES: 0
PALPITATIONS: 0
COLOR CHANGE: 0
WHEEZING: 0

## 2024-11-01 NOTE — PATIENT INSTRUCTIONS
Recommend frequent fluid intake, you can take over-the-counter medications for symptom relief.  We will send urine for culture to confirm the infection and notify when results come back usually within 2-3 days.  Call if your symptoms worsen, especially fevers, worsening back pain, nausea, vomiting or diarrhea.    For the cough, please take Mucinex twice daily in addition to inhaler use and as needed Robitussin. Please notify the office for any worsening respiratory concerns
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
## 2024-11-02 LAB — PSA SERPL-MCNC: 0.54 NG/ML

## 2024-11-03 LAB — BACTERIA UR CULT: ABNORMAL

## 2024-11-04 ENCOUNTER — TELEPHONE (OUTPATIENT)
Dept: PRIMARY CARE | Facility: CLINIC | Age: 79
End: 2024-11-04
Payer: MEDICARE

## 2024-11-04 LAB — BACTERIA UR CULT: ABNORMAL

## 2024-11-04 NOTE — TELEPHONE ENCOUNTER
----- Message from Esperanzaindy Koo sent at 11/4/2024  3:04 PM EST -----  Can you please update patient regarding most recent urine culture?  Positive for urine urinary tract infection secondary to E. coli.  He is to continue on current antibiotic as bacteria is susceptible to this medication.  Provider to be notified for any persistent/worsening infection concerns

## 2024-11-04 NOTE — TELEPHONE ENCOUNTER
Result Communication    Resulted Orders   POCT UA (nonautomated) manually resulted   Result Value Ref Range    POC Color, Urine Dark Hailey (A) Straw, Yellow, Light-Yellow    POC Appearance, Urine Cloudy (A) Clear    POC Glucose, Urine NEGATIVE NEGATIVE mg/dl    POC Bilirubin, Urine NEGATIVE NEGATIVE    POC Ketones, Urine NEGATIVE NEGATIVE mg/dl    POC Specific Gravity, Urine 1.025 1.005 - 1.035    POC Blood, Urine TRACE-Intact (A) NEGATIVE    POC PH, Urine 5.5 No Reference Range Established PH    POC Protein, Urine TRACE (A) NEGATIVE, 30 (1+) mg/dl    POC Urobilinogen, Urine 1.0 0.2, 1.0 EU/DL    Poc Nitrite, Urine NEGATIVE NEGATIVE    POC Leukocytes, Urine SMALL (1+) (A) NEGATIVE   Urine Culture   Result Value Ref Range    Urine Culture 20,000 - 80,000 Escherichia coli (A)        3:36 PM      Results were successfully communicated with the patient and they acknowledged their understanding.

## 2024-11-05 ENCOUNTER — TELEPHONE (OUTPATIENT)
Dept: PRIMARY CARE | Facility: CLINIC | Age: 79
End: 2024-11-05
Payer: MEDICARE

## 2024-11-05 NOTE — TELEPHONE ENCOUNTER
left detailed message on cell  phone informing pt of results and to call office if any questions

## 2024-11-05 NOTE — TELEPHONE ENCOUNTER
----- Message from Ruth Lowery sent at 11/5/2024 12:38 PM EST -----  Labs okay, stable, still with low platelets but stable

## 2024-11-13 ASSESSMENT — ENCOUNTER SYMPTOMS: COUGH: 1
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
## 2024-11-26 ENCOUNTER — APPOINTMENT (OUTPATIENT)
Dept: UROLOGY | Facility: CLINIC | Age: 79
End: 2024-11-26
Payer: MEDICARE

## 2024-11-26 DIAGNOSIS — N39.490 OVERFLOW INCONTINENCE OF URINE: ICD-10-CM

## 2024-11-26 DIAGNOSIS — R33.9 URINARY RETENTION: ICD-10-CM

## 2024-11-26 DIAGNOSIS — N40.1 BENIGN PROSTATIC HYPERPLASIA WITH WEAK URINARY STREAM: ICD-10-CM

## 2024-11-26 DIAGNOSIS — R39.12 BENIGN PROSTATIC HYPERPLASIA WITH WEAK URINARY STREAM: ICD-10-CM

## 2024-11-26 DIAGNOSIS — R39.9 URINARY SYMPTOM OR SIGN: Primary | ICD-10-CM

## 2024-11-26 LAB
POC APPEARANCE, URINE: CLEAR
POC BILIRUBIN, URINE: NEGATIVE
POC BLOOD, URINE: NEGATIVE
POC COLOR, URINE: ABNORMAL
POC GLUCOSE, URINE: NEGATIVE MG/DL
POC KETONES, URINE: NEGATIVE MG/DL
POC LEUKOCYTES, URINE: NEGATIVE
POC NITRITE,URINE: NEGATIVE
POC PH, URINE: 6 PH
POC PROTEIN, URINE: NEGATIVE MG/DL
POC SPECIFIC GRAVITY, URINE: 1.02
POC UROBILINOGEN, URINE: 0.2 EU/DL

## 2024-11-26 PROCEDURE — 99204 OFFICE O/P NEW MOD 45 MIN: CPT | Performed by: UROLOGY

## 2024-11-26 PROCEDURE — G2211 COMPLEX E/M VISIT ADD ON: HCPCS | Performed by: UROLOGY

## 2024-11-26 PROCEDURE — 1159F MED LIST DOCD IN RCRD: CPT | Performed by: UROLOGY

## 2024-11-26 PROCEDURE — 1126F AMNT PAIN NOTED NONE PRSNT: CPT | Performed by: UROLOGY

## 2024-11-26 ASSESSMENT — PAIN SCALES - GENERAL: PAINLEVEL_OUTOF10: 0-NO PAIN

## 2024-11-27 PROBLEM — N40.1 BENIGN PROSTATIC HYPERPLASIA WITH WEAK URINARY STREAM: Status: ACTIVE | Noted: 2024-11-27

## 2024-11-27 PROBLEM — R39.12 BENIGN PROSTATIC HYPERPLASIA WITH WEAK URINARY STREAM: Status: ACTIVE | Noted: 2024-11-27

## 2024-11-27 PROBLEM — Z12.5 PROSTATE CANCER SCREENING: Status: ACTIVE | Noted: 2024-11-27

## 2024-12-02 ENCOUNTER — OFFICE VISIT (OUTPATIENT)
Dept: PAIN MEDICINE | Facility: CLINIC | Age: 79
End: 2024-12-02
Payer: MEDICARE

## 2024-12-02 VITALS
SYSTOLIC BLOOD PRESSURE: 112 MMHG | RESPIRATION RATE: 16 BRPM | DIASTOLIC BLOOD PRESSURE: 74 MMHG | OXYGEN SATURATION: 96 % | HEART RATE: 60 BPM

## 2024-12-02 DIAGNOSIS — M47.816 SPONDYLOSIS OF LUMBAR REGION WITHOUT MYELOPATHY OR RADICULOPATHY: ICD-10-CM

## 2024-12-02 DIAGNOSIS — M51.16 DISPLACEMENT OF LUMBAR DISC WITH RADICULOPATHY: Primary | ICD-10-CM

## 2024-12-02 DIAGNOSIS — M15.9 GENERALIZED OSTEOARTHRITIS OF MULTIPLE SITES: ICD-10-CM

## 2024-12-02 DIAGNOSIS — M54.16 BILATERAL LUMBAR RADICULOPATHY: ICD-10-CM

## 2024-12-02 PROCEDURE — 1159F MED LIST DOCD IN RCRD: CPT | Performed by: NURSE PRACTITIONER

## 2024-12-02 PROCEDURE — 3078F DIAST BP <80 MM HG: CPT | Performed by: NURSE PRACTITIONER

## 2024-12-02 PROCEDURE — 1160F RVW MEDS BY RX/DR IN RCRD: CPT | Performed by: NURSE PRACTITIONER

## 2024-12-02 PROCEDURE — 1125F AMNT PAIN NOTED PAIN PRSNT: CPT | Performed by: NURSE PRACTITIONER

## 2024-12-02 PROCEDURE — 3074F SYST BP LT 130 MM HG: CPT | Performed by: NURSE PRACTITIONER

## 2024-12-02 PROCEDURE — 99213 OFFICE O/P EST LOW 20 MIN: CPT | Performed by: NURSE PRACTITIONER

## 2024-12-02 PROCEDURE — 1036F TOBACCO NON-USER: CPT | Performed by: NURSE PRACTITIONER

## 2024-12-02 RX ORDER — HYDROCODONE BITARTRATE AND ACETAMINOPHEN 7.5; 325 MG/1; MG/1
1 TABLET ORAL 2 TIMES DAILY PRN
Qty: 56 TABLET | Refills: 0 | Status: SHIPPED | OUTPATIENT
Start: 2025-01-28 | End: 2025-02-25

## 2024-12-02 RX ORDER — HYDROCODONE BITARTRATE AND ACETAMINOPHEN 7.5; 325 MG/1; MG/1
1 TABLET ORAL 2 TIMES DAILY PRN
Qty: 56 TABLET | Refills: 0 | Status: SHIPPED | OUTPATIENT
Start: 2024-12-31 | End: 2025-01-28

## 2024-12-02 RX ORDER — HYDROCODONE BITARTRATE AND ACETAMINOPHEN 7.5; 325 MG/1; MG/1
1 TABLET ORAL 2 TIMES DAILY
Qty: 56 TABLET | Refills: 0 | Status: SHIPPED | OUTPATIENT
Start: 2024-12-03 | End: 2024-12-31

## 2024-12-02 ASSESSMENT — ENCOUNTER SYMPTOMS
ARTHRALGIAS: 0
JOINT SWELLING: 0
NECK PAIN: 1
ENDOCRINE NEGATIVE: 1
RESPIRATORY NEGATIVE: 1
BACK PAIN: 1
GASTROINTESTINAL NEGATIVE: 1
ALLERGIC/IMMUNOLOGIC NEGATIVE: 1
HEMATOLOGIC/LYMPHATIC NEGATIVE: 1
PSYCHIATRIC NEGATIVE: 1
MYALGIAS: 0
EYES NEGATIVE: 1
PALPITATIONS: 0
WEAKNESS: 1
CONSTITUTIONAL NEGATIVE: 1
NECK STIFFNESS: 1
NUMBNESS: 1

## 2024-12-02 ASSESSMENT — PAIN SCALES - GENERAL: PAINLEVEL_OUTOF10: 3

## 2024-12-02 ASSESSMENT — PAIN - FUNCTIONAL ASSESSMENT: PAIN_FUNCTIONAL_ASSESSMENT: 0-10

## 2024-12-02 ASSESSMENT — PAIN DESCRIPTION - DESCRIPTORS: DESCRIPTORS: SHOOTING;SHARP;ACHING

## 2024-12-02 NOTE — PROGRESS NOTES
"Subjective   Patient ID: Leonides Diana \"Roddy\" is a 79 y.o. male who presents for Back Pain, Neck Pain, and Shoulder Pain (Right Hip).  Back Pain  Associated symptoms include numbness and weakness. Pertinent negatives include no chest pain.   Neck Pain   Associated symptoms include numbness and weakness. Pertinent negatives include no chest pain.   Shoulder Pain   Associated symptoms include numbness.       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 History of right total knee arthroplasty 11/14/18. Revision of the right knee 4/23/19.      Chronic right-sided low back and right-sided posterior hip and thigh pain that can be as high as a 7-8 out of a 10 with weightbearing activity. When sitting it feels like he sitting on a nail to the right buttocks. Does experience subjective weakness and numbness in the same pain pattern.     History of right-sided L5 transforaminal lumbar epidural steroid injection November 16, 2023 did not help very much to reduce pain or improve function.     Roddy is in a comprehensive pain management program. Continues with physical therapy exercises daily as tolerated for his low back, hips and knees and will continue to do so as long as they reduce pain and improve function. Practices activity modification. Takes medication as prescribed. In addition to prescribed medication also takes arthritis strength Tylenol throughout the day for additional coverage.     Norco 7.5 mg twice daily reduce his pain improves function up to 70%. Pain is a 3 out of 10 with medication. He denies major side effect from medication.      Able to manage ADLs with improved function with medication. Has below average quality family and social likely current condition " treatment.      Toxicology consistent September 9, 2024.  Annual controlled substance agreement and opioid risk tool are completed and scanned into the chart September 9, 2024.    For continuity:   Given the patient's report of reduced pain and improved functional ability without adverse effects, it is reasonable to treat with narcotic medications. The terms of the opioid agreement as well as the potential risks and adverse effects of the patient's medication regimen were discussed in detail. This includes if applicable due to dosage of medication permission to discuss and coordinate care with other treatment providers relevant to the patients condition. The patient verbalized understanding.     Risks and side effects of chronic opioid therapy including but not limited to tolerance, dependence, constipation, hyperalgesia, cognitive side effects, addiction and possible death due to overuse and or misuse were discussed. I also discussed that such medications when co-administered with other sedative agents including but not limited to alcohol, benzodiazepines, sedative hypnotics and illegal drugs could pose life threatening consequences including death. I also explained the impact that the administration of such medication has on a patient with obstructive sleep apnea and continued recommendations for use of apnea devices if ordered are prescribed by other physicians. In order to effectively and safely treat the pain, I also emphasized the importance of compliance with the treatment plan, as well as compliance with the terms of the opioid agreement, which was reviewed in detail. I explained the importance of being responsible with the medications and to take these only as prescribed, never in excess and never for reasons other than pain reduction. The patient was counseled on keeping the medications safe and locked away from children and other adults as well as disposal methods and options. The patient understood the  risks and instructions.     I also discussed with the patient in detail that based on the clinical response to the opioid medications and improvements of activities of daily living, sleep, and work performance in light of compliance with the treatment plan we can continue this form of therapy for the above chronic pain. The goal and rationale used for current treatment with chronic opioid medication is to control the pain and alleviate disability induced by the chronic pain condition noted above after failures of other non-opioid and nonpharmacological modalities to treat the chronic pain and the symptoms associated with have failed. The patient understood the goals in terms of the above treatment plan and had no further questions prior to leaving the office today.     Of note, the above-mentioned diagnoses/conditions and expected fluctuating nature of pain, and pain characteristic changes may lead to prolonged functional impairment requiring frequent and multiple reassessments with continued high level medical decision making. As noted, medication and medication management may require opiate therapy in excess of a routine less than 30 day medication requirement. The patient may require daily opiate therapy necessitating month-long prescription medication as noted above in order to perform activities of daily living and achieve acceptable quality of life with respect to their chronic pain condition for the foreseeable future. We monitor our patient's carefully through drug monitoring, medication counts, urine drug testing specific to their medication as well as a myriad of other substances and with frequent follow-ups with interval reassement of the chronic pain condition, its pathophysiology and prognosis.     The level of clinical decision making at this office visit is high due to high risks and complications including mortality and morbidity related to acute and chronic pain with respects to life, bodily  function, and treatment. Risks and clinical decisions with respect to under treatment, failure to maintain adequate treatment, and/or overtreatment complications and outcomes were discussed with the patient with respect to their chronic pain conditions, interventional therapies, as well as the use of various medications including possible controlled/dangerous medications. The amount and complexity of reviewed data at this in subsequent office visits is high given patient's fluctuating clinical presentation, laboratory and radiographic reports, prescription monitoring program data, and medication history as well as other relevant data as noted above. Pertinent negatives and positives data was used in consideration for the above-mentioned high complexity.      Given the patient's total MED, general use of daily opiates, or other coadministered medications in various classes the patient was offered a prescription for Narcan. I instructed the patient that it is important that patient fill this medication in order to demonstrate understanding of the gravity of possible side effects including respiratory depression and risk of overdose of this opiate load or medication combination. As such patient will be required to bring Narcan prescription to follow-up appointments as part of compliance with continued opiate care.     With respect to opiate induced constipation I discussed multiple ways to combat this problem including staying hydrated and taking over-the-counter medications such as Dulcolax, Miralax and Senna. If these treatments are not effective we could consider such medications as Amitiza, Linzess and Movantik.     Disclaimer: This note was transcribed using an audio transcription device. As such, minor errors may be present with regard to spelling, punctuation, and inadvertent word insertion. Please disregard such errors.    Narrative & Impression   Interpreted By:  Zack Olivier,   STUDY:  XR HIP RIGHT WITH  PELVIS WHEN PERFORMED 2 OR 3 VIEWS; XR HIP LEFT  WITH PELVIS WHEN PERFORMED 2 OR 3 VIEWS; ;  1/8/2024 8:54 am      INDICATION:  Signs/Symptoms:pain.      COMPARISON:  None.      ACCESSION NUMBER(S):  [id number]; [id number]      ORDERING CLINICIAN:  RODDY DEGROOT      FINDINGS:  Bilateral total hip arthroplasty. No evidence of hardware fracture or  abnormal perihardware lucency. Multilevel degenerative change in the  spine.      IMPRESSION:  Intact bilateral total hip arthroplasty.          MACRO:  None      Signed by: Zack Olivier 1/9/2024 11:01 AM  Dictation workstation:   [id number]     Narrative & Impression  MRN: [id number]  Patient Name: TREVOR ERARDON     STUDY:  MRI L-SPINE WO;  12/2/2022 6:24 pm     INDICATION:  back pain,leg pain  M48.061: Lumbar stenosis   PT HAS BOTH KNEES AND  BOTH HIPS REPLACED.     COMPARISON:  MRI of the lumbar spine dated 09/26/2011     ACCESSION NUMBER(S):  [id number]     ORDERING CLINICIAN:  JOEL HARRINGTON     TECHNIQUE:  Sagittal T1, T2, STIR, axial T1 and T2 weighted images of the lumbar  spine were acquired.     FINDINGS:  There are 5 lumbar type non rib-bearing vertebral bodies, with lowest  well-formed intervertebral disc space labeled L5-S1.     Multilevel spondylolisthesis of the lumbar spine is present,  including a 3-4 mm retrolisthesis of L1 on L2, 4-5 mm anterolisthesis  of L3 on L4, and a 5-6 mm anterolisthesis of L5 on S1, similar in  appearance to prior exam in 2011. There is again evidence of  bilateral pars articularis defects at L5.     Mild insufficiency endplate changes are present at several levels,  without associated compression fractures. Posterior elements of the  lumbar spine do not demonstrate evidence of acute trauma.     There is multilevel intervertebral disc height loss, mild-to-moderate  at L2-L3 and L3-L4, and moderate to severe at L5-S1.     Visualized lower thoracic spinal cord terminates at L2-L3 in  demonstrates no discrete signal  abnormality.     T12-L1: No significant posterior disc contour abnormality is present.  No spinal canal or neural foraminal stenosis.     L1-L2: Mild disc bulge, hypertrophic facet changes and ligamentum  flavum thickening slightly deform the subarachnoid space, without  significant spinal canal stenosis. Mild bilateral neural foraminal  stenosis is present due to hypertrophic facet changes and bulging  disc material.     L2-L3: Combination of bulging disc, hypertrophic facet changes and  ligamentum flavum thickening deform the subarachnoid space and cause  mild spinal canal narrowing, similar to slightly worsened in  appearance compared to prior exam in 2011, with mild bilateral neural  foraminal stenosis due to bulging disc material, also slightly  worsened since prior exam.     L3-L4: Combination of spondylolisthesis, circumferential disc bulge,  hypertrophic facet changes and ligamentum flavum thickening cause  moderate to severe spinal canal stenosis, similar to slightly  worsened in appearance since prior exam in 2011, with near complete  effacement of the subarachnoid space. Moderate to severe bilateral  neural foraminal stenosis appears slightly worsened since prior exam.     L4-L5: No significant posterior disc contour abnormality is present.  There is no significant spinal canal stenosis. There is mild  bilateral neural foraminal narrowing due to hypertrophic facet  changes and para foraminal disc bulge, similar in appearance to prior  study.     L5-S1: Combination of spondylolisthesis and uncovered disc material  do not cause significant spinal canal narrowing, although there is  moderate to severe bilateral neural foraminal stenosis due to  spondylolisthesis and hypertrophic facet changes, similar in  appearance to prior exam.     Multilevel degenerate facet osteoarthropathy is present at nearly  every level of the lumbar spine, most pronounced at L4-5 and L5-S1  bilaterally.     There is diffuse mild  fatty atrophy of the paraspinal musculature.  Mild STIR hyperintense edema is present in the paraspinal musculature  surrounding the L4-5 facets bilaterally.     IMPRESSION:  1.  Multilevel degenerative changes of the lumbar spine are markedly  worsened in appearance compared to prior exam in 2011, including  slight worsening of moderate to severe spinal canal stenosis at the  level of L3-L4 due to combination of spondylolisthesis, uncovered  disc material, hypertrophic facet changes and ligamentum flavum  thickening. Additional multilevel varying degrees of spinal canal or  neural foraminal stenosis are detailed above.  2. Redemonstration of anterolisthesis of L5 on S1 with bilateral pars  articularis defects of L5, similar in appearance to prior exam in  2011.    Review of Systems   Constitutional: Negative.    HENT: Negative.     Eyes: Negative.    Respiratory: Negative.     Cardiovascular:  Positive for leg swelling. Negative for chest pain and palpitations.   Gastrointestinal: Negative.    Endocrine: Negative.    Genitourinary: Negative.    Musculoskeletal:  Positive for back pain, neck pain and neck stiffness. Negative for arthralgias, gait problem, joint swelling and myalgias.   Skin: Negative.    Allergic/Immunologic: Negative.    Neurological:  Positive for weakness and numbness.   Hematological: Negative.    Psychiatric/Behavioral: Negative.         Objective   Physical Exam  Vitals and nursing note reviewed.   Constitutional:       Appearance: Normal appearance.   HENT:      Head: Normocephalic and atraumatic.   Eyes:      Conjunctiva/sclera: Conjunctivae normal.   Cardiovascular:      Pulses: Normal pulses.   Pulmonary:      Effort: Pulmonary effort is normal. No respiratory distress.   Musculoskeletal:      Right lower leg: Edema present.      Left lower leg: Edema present.      Comments: +2 pitting edema to ankles and pretibial areas.   Skin:     General: Skin is warm and dry.      Capillary Refill:  Capillary refill takes 2 to 3 seconds.   Neurological:      Mental Status: He is alert and oriented to person, place, and time.      Cranial Nerves: No cranial nerve deficit.      Sensory: No sensory deficit.      Motor: Weakness present.      Gait: Gait abnormal.      Comments: Weakness with right ankle dorsiflexion.    Negative straight leg raise.  No clonus.    Ambulates with mildly antalgic gait.   Psychiatric:         Mood and Affect: Mood normal.         Behavior: Behavior normal.         Assessment/Plan   Problem List Items Addressed This Visit             ICD-10-CM    Generalized osteoarthritis of multiple sites M15.9    Relevant Medications    HYDROcodone-acetaminophen (Norco) 7.5-325 mg tablet (Start on 12/3/2024)    HYDROcodone-acetaminophen (Norco) 7.5-325 mg tablet (Start on 12/31/2024)    HYDROcodone-acetaminophen (Norco) 7.5-325 mg tablet (Start on 1/28/2025)    Spondylosis of lumbar region without myelopathy or radiculopathy M47.816    Relevant Medications    HYDROcodone-acetaminophen (Norco) 7.5-325 mg tablet (Start on 12/3/2024)    HYDROcodone-acetaminophen (Norco) 7.5-325 mg tablet (Start on 12/31/2024)    HYDROcodone-acetaminophen (Norco) 7.5-325 mg tablet (Start on 1/28/2025)    Displacement of lumbar disc with radiculopathy - Primary M51.16    Bilateral lumbar radiculopathy M54.16    Relevant Medications    HYDROcodone-acetaminophen (Norco) 7.5-325 mg tablet (Start on 12/3/2024)    HYDROcodone-acetaminophen (Norco) 7.5-325 mg tablet (Start on 12/31/2024)    HYDROcodone-acetaminophen (Norco) 7.5-325 mg tablet (Start on 1/28/2025)        Follow-up 12 weeks.    JÚNIOR Danielle-CNP 12/02/24 12:47 PM

## 2024-12-18 DIAGNOSIS — E03.9 ADULT HYPOTHYROIDISM: ICD-10-CM

## 2024-12-18 RX ORDER — LEVOTHYROXINE SODIUM 75 UG/1
75 TABLET ORAL DAILY
Qty: 90 TABLET | Refills: 0 | Status: SHIPPED | OUTPATIENT
Start: 2024-12-18

## 2024-12-23 DIAGNOSIS — K90.89 BILE SALT-INDUCED DIARRHEA (HHS-HCC): ICD-10-CM

## 2024-12-24 RX ORDER — DICYCLOMINE HYDROCHLORIDE 20 MG/1
TABLET ORAL
Qty: 90 TABLET | Refills: 0 | Status: SHIPPED | OUTPATIENT
Start: 2024-12-24
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
## 2024-12-31 ENCOUNTER — APPOINTMENT (OUTPATIENT)
Dept: UROLOGY | Facility: CLINIC | Age: 79
End: 2024-12-31
Payer: MEDICARE

## 2024-12-31 DIAGNOSIS — R39.15 URINARY URGENCY: ICD-10-CM

## 2024-12-31 DIAGNOSIS — R39.9 URINARY SYMPTOM OR SIGN: ICD-10-CM

## 2024-12-31 DIAGNOSIS — N39.490 OVERFLOW INCONTINENCE OF URINE: Primary | ICD-10-CM

## 2024-12-31 PROCEDURE — 1126F AMNT PAIN NOTED NONE PRSNT: CPT | Performed by: UROLOGY

## 2024-12-31 PROCEDURE — G2211 COMPLEX E/M VISIT ADD ON: HCPCS | Performed by: UROLOGY

## 2024-12-31 PROCEDURE — 99213 OFFICE O/P EST LOW 20 MIN: CPT | Performed by: UROLOGY

## 2024-12-31 PROCEDURE — 1159F MED LIST DOCD IN RCRD: CPT | Performed by: UROLOGY

## 2024-12-31 RX ORDER — VIBEGRON 75 MG/1
75 TABLET, FILM COATED ORAL DAILY
Qty: 30 TABLET | Refills: 11 | Status: SHIPPED | OUTPATIENT
Start: 2024-12-31

## 2024-12-31 RX ORDER — VIBEGRON 75 MG/1
TABLET, FILM COATED ORAL
COMMUNITY
End: 2024-12-31 | Stop reason: SDUPTHER

## 2024-12-31 ASSESSMENT — PAIN SCALES - GENERAL: PAINLEVEL_OUTOF10: 0-NO PAIN

## 2025-01-25 DIAGNOSIS — R35.0 URINARY FREQUENCY: Primary | ICD-10-CM

## 2025-01-25 RX ORDER — MIRABEGRON 50 MG/1
50 TABLET, FILM COATED, EXTENDED RELEASE ORAL DAILY
Qty: 30 TABLET | Refills: 11 | Status: SHIPPED | OUTPATIENT
Start: 2025-01-25

## 2025-02-07 ENCOUNTER — OFFICE VISIT (OUTPATIENT)
Dept: PAIN MEDICINE | Facility: CLINIC | Age: 80
End: 2025-02-07
Payer: MEDICARE

## 2025-02-07 VITALS
HEART RATE: 64 BPM | OXYGEN SATURATION: 97 % | RESPIRATION RATE: 16 BRPM | SYSTOLIC BLOOD PRESSURE: 104 MMHG | DIASTOLIC BLOOD PRESSURE: 67 MMHG

## 2025-02-07 DIAGNOSIS — M15.9 GENERALIZED OSTEOARTHRITIS OF MULTIPLE SITES: ICD-10-CM

## 2025-02-07 DIAGNOSIS — M47.816 SPONDYLOSIS OF LUMBAR REGION WITHOUT MYELOPATHY OR RADICULOPATHY: ICD-10-CM

## 2025-02-07 DIAGNOSIS — M54.16 BILATERAL LUMBAR RADICULOPATHY: Primary | ICD-10-CM

## 2025-02-07 PROCEDURE — 1160F RVW MEDS BY RX/DR IN RCRD: CPT | Performed by: NURSE PRACTITIONER

## 2025-02-07 PROCEDURE — 1125F AMNT PAIN NOTED PAIN PRSNT: CPT | Performed by: NURSE PRACTITIONER

## 2025-02-07 PROCEDURE — 1036F TOBACCO NON-USER: CPT | Performed by: NURSE PRACTITIONER

## 2025-02-07 PROCEDURE — 99213 OFFICE O/P EST LOW 20 MIN: CPT | Performed by: NURSE PRACTITIONER

## 2025-02-07 PROCEDURE — 1159F MED LIST DOCD IN RCRD: CPT | Performed by: NURSE PRACTITIONER

## 2025-02-07 PROCEDURE — 3074F SYST BP LT 130 MM HG: CPT | Performed by: NURSE PRACTITIONER

## 2025-02-07 PROCEDURE — 3078F DIAST BP <80 MM HG: CPT | Performed by: NURSE PRACTITIONER

## 2025-02-07 RX ORDER — HYDROCODONE BITARTRATE AND ACETAMINOPHEN 7.5; 325 MG/1; MG/1
1 TABLET ORAL 2 TIMES DAILY PRN
Qty: 56 TABLET | Refills: 0 | Status: SHIPPED | OUTPATIENT
Start: 2025-02-26 | End: 2025-03-26

## 2025-02-07 RX ORDER — HYDROCODONE BITARTRATE AND ACETAMINOPHEN 7.5; 325 MG/1; MG/1
1 TABLET ORAL 2 TIMES DAILY PRN
Qty: 56 TABLET | Refills: 0 | Status: SHIPPED | OUTPATIENT
Start: 2025-04-23 | End: 2025-05-21

## 2025-02-07 RX ORDER — HYDROCODONE BITARTRATE AND ACETAMINOPHEN 7.5; 325 MG/1; MG/1
1 TABLET ORAL 2 TIMES DAILY PRN
Qty: 56 TABLET | Refills: 0 | Status: SHIPPED | OUTPATIENT
Start: 2025-03-26 | End: 2025-04-23

## 2025-02-07 ASSESSMENT — ENCOUNTER SYMPTOMS
BACK PAIN: 1
ALLERGIC/IMMUNOLOGIC NEGATIVE: 1
PSYCHIATRIC NEGATIVE: 1
JOINT SWELLING: 0
NUMBNESS: 1
RESPIRATORY NEGATIVE: 1
GASTROINTESTINAL NEGATIVE: 1
CONSTITUTIONAL NEGATIVE: 1
WEAKNESS: 1
PALPITATIONS: 0
EYES NEGATIVE: 1
MYALGIAS: 0
HEMATOLOGIC/LYMPHATIC NEGATIVE: 1
ENDOCRINE NEGATIVE: 1
NECK PAIN: 1
ARTHRALGIAS: 0
NECK STIFFNESS: 1

## 2025-02-07 ASSESSMENT — PAIN - FUNCTIONAL ASSESSMENT: PAIN_FUNCTIONAL_ASSESSMENT: 0-10

## 2025-02-07 ASSESSMENT — PAIN SCALES - GENERAL: PAINLEVEL_OUTOF10: 3

## 2025-02-07 ASSESSMENT — PAIN DESCRIPTION - DESCRIPTORS: DESCRIPTORS: SHARP;ACHING

## 2025-02-07 NOTE — PROGRESS NOTES
"Subjective   Patient ID: Leonides Diana \"Roddy\" is a 79 y.o. male who presents for Back Pain.  Back Pain  Associated symptoms include numbness and weakness. Pertinent negatives include no chest pain.   Neck Pain   Associated symptoms include numbness and weakness. Pertinent negatives include no chest pain.   Shoulder Pain   Associated symptoms include numbness.       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 History of right total knee arthroplasty 11/14/18. Revision of the right knee 4/23/19.     Roddy fell after slipping on the ice yesterday February 6, 2025 at home on his driveway.  He landed on his left hip and shoulder.  Does feel sore.  Denies hitting his head.  Offered left shoulder and left hip imaging.  He declines for right now as he is sore but not impaired to bear weight.         Chronic right-sided low back and right-sided posterior hip and thigh pain that can be as high as a 3-4 out of a 10 with weightbearing activity. When sitting it feels like he sitting on a nail to the right buttocks. Does experience subjective weakness and numbness in the same pain pattern.     History of right-sided L5 transforaminal lumbar epidural steroid injection November 16, 2023 did not help very much to reduce pain or improve function.     Roddy is in a comprehensive pain management program. Continues with physical therapy exercises daily as tolerated for his low back, hips and knees and will continue to do so as long as they reduce pain and improve function. Practices activity modification. Takes medication as prescribed. In addition to prescribed medication also takes arthritis strength Tylenol throughout the day for additional coverage.     Norco 7.5 mg twice daily reduce his " pain improves function up to 70%. Pain is a 3 to 4 out of 10 with medication. He denies major side effect from medication.      Able to manage ADLs with improved function with medication. Has below average quality family and social likely current condition treatment.      Toxicology consistent September 9, 2024.  Annual controlled substance agreement and opioid risk tool are completed and scanned into the chart September 9, 2024.    For continuity:   Given the patient's report of reduced pain and improved functional ability without adverse effects, it is reasonable to treat with narcotic medications. The terms of the opioid agreement as well as the potential risks and adverse effects of the patient's medication regimen were discussed in detail. This includes if applicable due to dosage of medication permission to discuss and coordinate care with other treatment providers relevant to the patients condition. The patient verbalized understanding.     Risks and side effects of chronic opioid therapy including but not limited to tolerance, dependence, constipation, hyperalgesia, cognitive side effects, addiction and possible death due to overuse and or misuse were discussed. I also discussed that such medications when co-administered with other sedative agents including but not limited to alcohol, benzodiazepines, sedative hypnotics and illegal drugs could pose life threatening consequences including death. I also explained the impact that the administration of such medication has on a patient with obstructive sleep apnea and continued recommendations for use of apnea devices if ordered are prescribed by other physicians. In order to effectively and safely treat the pain, I also emphasized the importance of compliance with the treatment plan, as well as compliance with the terms of the opioid agreement, which was reviewed in detail. I explained the importance of being responsible with the medications and to take these  only as prescribed, never in excess and never for reasons other than pain reduction. The patient was counseled on keeping the medications safe and locked away from children and other adults as well as disposal methods and options. The patient understood the risks and instructions.     I also discussed with the patient in detail that based on the clinical response to the opioid medications and improvements of activities of daily living, sleep, and work performance in light of compliance with the treatment plan we can continue this form of therapy for the above chronic pain. The goal and rationale used for current treatment with chronic opioid medication is to control the pain and alleviate disability induced by the chronic pain condition noted above after failures of other non-opioid and nonpharmacological modalities to treat the chronic pain and the symptoms associated with have failed. The patient understood the goals in terms of the above treatment plan and had no further questions prior to leaving the office today.     Of note, the above-mentioned diagnoses/conditions and expected fluctuating nature of pain, and pain characteristic changes may lead to prolonged functional impairment requiring frequent and multiple reassessments with continued high level medical decision making. As noted, medication and medication management may require opiate therapy in excess of a routine less than 30 day medication requirement. The patient may require daily opiate therapy necessitating month-long prescription medication as noted above in order to perform activities of daily living and achieve acceptable quality of life with respect to their chronic pain condition for the foreseeable future. We monitor our patient's carefully through drug monitoring, medication counts, urine drug testing specific to their medication as well as a myriad of other substances and with frequent follow-ups with interval reassement of the chronic pain  condition, its pathophysiology and prognosis.     The level of clinical decision making at this office visit is high due to high risks and complications including mortality and morbidity related to acute and chronic pain with respects to life, bodily function, and treatment. Risks and clinical decisions with respect to under treatment, failure to maintain adequate treatment, and/or overtreatment complications and outcomes were discussed with the patient with respect to their chronic pain conditions, interventional therapies, as well as the use of various medications including possible controlled/dangerous medications. The amount and complexity of reviewed data at this in subsequent office visits is high given patient's fluctuating clinical presentation, laboratory and radiographic reports, prescription monitoring program data, and medication history as well as other relevant data as noted above. Pertinent negatives and positives data was used in consideration for the above-mentioned high complexity.      Given the patient's total MED, general use of daily opiates, or other coadministered medications in various classes the patient was offered a prescription for Narcan. I instructed the patient that it is important that patient fill this medication in order to demonstrate understanding of the gravity of possible side effects including respiratory depression and risk of overdose of this opiate load or medication combination. As such patient will be required to bring Narcan prescription to follow-up appointments as part of compliance with continued opiate care.     With respect to opiate induced constipation I discussed multiple ways to combat this problem including staying hydrated and taking over-the-counter medications such as Dulcolax, Miralax and Senna. If these treatments are not effective we could consider such medications as Amitiza, Linzess and Movantik.     Disclaimer: This note was transcribed using an audio  transcription device. As such, minor errors may be present with regard to spelling, punctuation, and inadvertent word insertion. Please disregard such errors.    Narrative & Impression   Interpreted By:  Zack Olivier,   STUDY:  XR HIP RIGHT WITH PELVIS WHEN PERFORMED 2 OR 3 VIEWS; XR HIP LEFT  WITH PELVIS WHEN PERFORMED 2 OR 3 VIEWS; ;  1/8/2024 8:54 am      INDICATION:  Signs/Symptoms:pain.      COMPARISON:  None.      ACCESSION NUMBER(S):  [id number]; [id number]      ORDERING CLINICIAN:  RODDY DEGROOT      FINDINGS:  Bilateral total hip arthroplasty. No evidence of hardware fracture or  abnormal perihardware lucency. Multilevel degenerative change in the  spine.      IMPRESSION:  Intact bilateral total hip arthroplasty.          MACRO:  None      Signed by: Zack Olivier 1/9/2024 11:01 AM  Dictation workstation:   [id number]     Narrative & Impression  MRN: [id number]  Patient Name: TREVOR REARDON     STUDY:  MRI L-SPINE WO;  12/2/2022 6:24 pm     INDICATION:  back pain,leg pain  M48.061: Lumbar stenosis   PT HAS BOTH KNEES AND  BOTH HIPS REPLACED.     COMPARISON:  MRI of the lumbar spine dated 09/26/2011     ACCESSION NUMBER(S):  [id number]     ORDERING CLINICIAN:  JOEL HARRINGTON     TECHNIQUE:  Sagittal T1, T2, STIR, axial T1 and T2 weighted images of the lumbar  spine were acquired.     FINDINGS:  There are 5 lumbar type non rib-bearing vertebral bodies, with lowest  well-formed intervertebral disc space labeled L5-S1.     Multilevel spondylolisthesis of the lumbar spine is present,  including a 3-4 mm retrolisthesis of L1 on L2, 4-5 mm anterolisthesis  of L3 on L4, and a 5-6 mm anterolisthesis of L5 on S1, similar in  appearance to prior exam in 2011. There is again evidence of  bilateral pars articularis defects at L5.     Mild insufficiency endplate changes are present at several levels,  without associated compression fractures. Posterior elements of the  lumbar spine do not demonstrate evidence of  acute trauma.     There is multilevel intervertebral disc height loss, mild-to-moderate  at L2-L3 and L3-L4, and moderate to severe at L5-S1.     Visualized lower thoracic spinal cord terminates at L2-L3 in  demonstrates no discrete signal abnormality.     T12-L1: No significant posterior disc contour abnormality is present.  No spinal canal or neural foraminal stenosis.     L1-L2: Mild disc bulge, hypertrophic facet changes and ligamentum  flavum thickening slightly deform the subarachnoid space, without  significant spinal canal stenosis. Mild bilateral neural foraminal  stenosis is present due to hypertrophic facet changes and bulging  disc material.     L2-L3: Combination of bulging disc, hypertrophic facet changes and  ligamentum flavum thickening deform the subarachnoid space and cause  mild spinal canal narrowing, similar to slightly worsened in  appearance compared to prior exam in 2011, with mild bilateral neural  foraminal stenosis due to bulging disc material, also slightly  worsened since prior exam.     L3-L4: Combination of spondylolisthesis, circumferential disc bulge,  hypertrophic facet changes and ligamentum flavum thickening cause  moderate to severe spinal canal stenosis, similar to slightly  worsened in appearance since prior exam in 2011, with near complete  effacement of the subarachnoid space. Moderate to severe bilateral  neural foraminal stenosis appears slightly worsened since prior exam.     L4-L5: No significant posterior disc contour abnormality is present.  There is no significant spinal canal stenosis. There is mild  bilateral neural foraminal narrowing due to hypertrophic facet  changes and para foraminal disc bulge, similar in appearance to prior  study.     L5-S1: Combination of spondylolisthesis and uncovered disc material  do not cause significant spinal canal narrowing, although there is  moderate to severe bilateral neural foraminal stenosis due to  spondylolisthesis and  hypertrophic facet changes, similar in  appearance to prior exam.     Multilevel degenerate facet osteoarthropathy is present at nearly  every level of the lumbar spine, most pronounced at L4-5 and L5-S1  bilaterally.     There is diffuse mild fatty atrophy of the paraspinal musculature.  Mild STIR hyperintense edema is present in the paraspinal musculature  surrounding the L4-5 facets bilaterally.     IMPRESSION:  1.  Multilevel degenerative changes of the lumbar spine are markedly  worsened in appearance compared to prior exam in 2011, including  slight worsening of moderate to severe spinal canal stenosis at the  level of L3-L4 due to combination of spondylolisthesis, uncovered  disc material, hypertrophic facet changes and ligamentum flavum  thickening. Additional multilevel varying degrees of spinal canal or  neural foraminal stenosis are detailed above.  2. Redemonstration of anterolisthesis of L5 on S1 with bilateral pars  articularis defects of L5, similar in appearance to prior exam in  2011.    Review of Systems   Constitutional: Negative.    HENT: Negative.     Eyes: Negative.    Respiratory: Negative.     Cardiovascular:  Positive for leg swelling. Negative for chest pain and palpitations.   Gastrointestinal: Negative.    Endocrine: Negative.    Genitourinary: Negative.    Musculoskeletal:  Positive for back pain, neck pain and neck stiffness. Negative for arthralgias, gait problem, joint swelling and myalgias.   Skin: Negative.    Allergic/Immunologic: Negative.    Neurological:  Positive for weakness and numbness.   Hematological: Negative.    Psychiatric/Behavioral: Negative.         Objective   Physical Exam  Vitals and nursing note reviewed.   Constitutional:       Appearance: Normal appearance.   HENT:      Head: Normocephalic and atraumatic.   Eyes:      Conjunctiva/sclera: Conjunctivae normal.   Cardiovascular:      Pulses: Normal pulses.   Pulmonary:      Effort: Pulmonary effort is normal. No  respiratory distress.   Musculoskeletal:      Right lower leg: Edema present.      Left lower leg: Edema present.      Comments: +2 pitting edema to ankles and pretibial areas.   Skin:     General: Skin is warm and dry.      Capillary Refill: Capillary refill takes 2 to 3 seconds.   Neurological:      Mental Status: He is alert and oriented to person, place, and time.      Cranial Nerves: No cranial nerve deficit.      Sensory: No sensory deficit.      Motor: Weakness present.      Gait: Gait abnormal.      Comments: Weakness with right ankle dorsiflexion.    Negative straight leg raise.  No clonus.    Ambulates with mildly antalgic gait.   Psychiatric:         Mood and Affect: Mood normal.         Behavior: Behavior normal.         Assessment/Plan   Problem List Items Addressed This Visit             ICD-10-CM    Generalized osteoarthritis of multiple sites M15.9    Relevant Medications    HYDROcodone-acetaminophen (Norco) 7.5-325 mg tablet (Start on 2/26/2025)    HYDROcodone-acetaminophen (Norco) 7.5-325 mg tablet (Start on 3/26/2025)    HYDROcodone-acetaminophen (Norco) 7.5-325 mg tablet (Start on 4/23/2025)    Spondylosis of lumbar region without myelopathy or radiculopathy M47.816    Relevant Medications    HYDROcodone-acetaminophen (Norco) 7.5-325 mg tablet (Start on 2/26/2025)    HYDROcodone-acetaminophen (Norco) 7.5-325 mg tablet (Start on 3/26/2025)    HYDROcodone-acetaminophen (Norco) 7.5-325 mg tablet (Start on 4/23/2025)    Bilateral lumbar radiculopathy - Primary M54.16    Relevant Medications    HYDROcodone-acetaminophen (Norco) 7.5-325 mg tablet (Start on 2/26/2025)    HYDROcodone-acetaminophen (Norco) 7.5-325 mg tablet (Start on 3/26/2025)    HYDROcodone-acetaminophen (Norco) 7.5-325 mg tablet (Start on 4/23/2025)        Follow-up 12 weeks.    JÚNIOR Danielle-CNP 02/07/25 12:27 PM

## 2025-02-13 ENCOUNTER — APPOINTMENT (OUTPATIENT)
Dept: GASTROENTEROLOGY | Facility: CLINIC | Age: 80
End: 2025-02-13
Payer: MEDICARE

## 2025-02-17 ENCOUNTER — APPOINTMENT (OUTPATIENT)
Dept: PAIN MEDICINE | Facility: CLINIC | Age: 80
End: 2025-02-17
Payer: MEDICARE

## 2025-02-20 ENCOUNTER — APPOINTMENT (OUTPATIENT)
Dept: PAIN MEDICINE | Facility: CLINIC | Age: 80
End: 2025-02-20
Payer: MEDICARE

## 2025-02-25 DIAGNOSIS — N40.1 BENIGN PROSTATIC HYPERPLASIA WITH URINARY FREQUENCY: ICD-10-CM

## 2025-02-25 DIAGNOSIS — R35.0 BENIGN PROSTATIC HYPERPLASIA WITH URINARY FREQUENCY: ICD-10-CM

## 2025-02-25 DIAGNOSIS — F33.9 RECURRENT MAJOR DEPRESSIVE DISORDER, REMISSION STATUS UNSPECIFIED (CMS-HCC): ICD-10-CM

## 2025-02-25 RX ORDER — FINASTERIDE 1 MG/1
1 TABLET, FILM COATED ORAL DAILY
Qty: 90 TABLET | Refills: 3 | Status: SHIPPED | OUTPATIENT
Start: 2025-02-25

## 2025-02-25 RX ORDER — VENLAFAXINE HYDROCHLORIDE 150 MG/1
150 CAPSULE, EXTENDED RELEASE ORAL DAILY
Qty: 90 CAPSULE | Refills: 3 | Status: SHIPPED | OUTPATIENT
Start: 2025-02-25

## 2025-02-28 DIAGNOSIS — I10 BENIGN HYPERTENSION: ICD-10-CM

## 2025-02-28 RX ORDER — DOXAZOSIN 1 MG/1
1 TABLET ORAL DAILY
Qty: 90 TABLET | Refills: 2 | Status: SHIPPED | OUTPATIENT
Start: 2025-02-28

## 2025-03-11 DIAGNOSIS — K21.9 GASTROESOPHAGEAL REFLUX DISEASE WITHOUT ESOPHAGITIS: ICD-10-CM

## 2025-03-11 RX ORDER — PANTOPRAZOLE SODIUM 40 MG/1
40 TABLET, DELAYED RELEASE ORAL DAILY
Qty: 90 TABLET | Refills: 2 | Status: SHIPPED | OUTPATIENT
Start: 2025-03-11

## 2025-03-16 DIAGNOSIS — E03.9 ADULT HYPOTHYROIDISM: ICD-10-CM

## 2025-03-17 DIAGNOSIS — E03.9 ADULT HYPOTHYROIDISM: ICD-10-CM

## 2025-03-17 RX ORDER — LEVOTHYROXINE SODIUM 75 UG/1
75 TABLET ORAL DAILY
Qty: 90 TABLET | Refills: 0 | Status: SHIPPED | OUTPATIENT
Start: 2025-03-17

## 2025-03-18 RX ORDER — LEVOTHYROXINE SODIUM 75 UG/1
75 TABLET ORAL DAILY
Qty: 90 TABLET | Refills: 0 | OUTPATIENT
Start: 2025-03-18

## 2025-03-27 RX ORDER — ATROPINE SULFATE 10 MG/ML
SOLUTION/ DROPS OPHTHALMIC
COMMUNITY
Start: 2025-03-11

## 2025-03-31 ENCOUNTER — APPOINTMENT (OUTPATIENT)
Dept: PRIMARY CARE | Facility: CLINIC | Age: 80
End: 2025-03-31
Payer: MEDICARE

## 2025-03-31 ENCOUNTER — TELEPHONE (OUTPATIENT)
Dept: PRIMARY CARE | Facility: CLINIC | Age: 80
End: 2025-03-31

## 2025-03-31 DIAGNOSIS — K90.89 BILE SALT-INDUCED DIARRHEA (HHS-HCC): ICD-10-CM

## 2025-03-31 RX ORDER — DICYCLOMINE HYDROCHLORIDE 20 MG/1
TABLET ORAL
Qty: 90 TABLET | Refills: 1 | Status: SHIPPED | OUTPATIENT
Start: 2025-03-31

## 2025-03-31 NOTE — TELEPHONE ENCOUNTER
Rx Refill Request Telephone Encounter    Name:  Leonides Diana  :  [id number]  Medication Name:    DICYCLOMINE 20MG Q/D 90 DAY   Specific Pharmacy location:  /   Date of last appointment:  10/07/2024  Date of next appointment:  2025  Best number to reach patient:  [phone]

## 2025-04-02 ENCOUNTER — APPOINTMENT (OUTPATIENT)
Dept: PRIMARY CARE | Facility: CLINIC | Age: 80
End: 2025-04-02
Payer: MEDICARE

## 2025-04-02 ENCOUNTER — HOSPITAL ENCOUNTER (OUTPATIENT)
Dept: RADIOLOGY | Facility: HOSPITAL | Age: 80
Discharge: HOME | End: 2025-04-02
Payer: MEDICARE

## 2025-04-02 VITALS
SYSTOLIC BLOOD PRESSURE: 112 MMHG | TEMPERATURE: 97.9 F | HEART RATE: 73 BPM | WEIGHT: 264 LBS | BODY MASS INDEX: 43.99 KG/M2 | HEIGHT: 65 IN | OXYGEN SATURATION: 93 % | DIASTOLIC BLOOD PRESSURE: 71 MMHG

## 2025-04-02 DIAGNOSIS — F33.0 MILD EPISODE OF RECURRENT MAJOR DEPRESSIVE DISORDER (CMS-HCC): ICD-10-CM

## 2025-04-02 DIAGNOSIS — G47.33 OBSTRUCTIVE SLEEP APNEA: ICD-10-CM

## 2025-04-02 DIAGNOSIS — M54.2 NECK PAIN: ICD-10-CM

## 2025-04-02 DIAGNOSIS — M25.511 CHRONIC PAIN OF BOTH SHOULDERS: ICD-10-CM

## 2025-04-02 DIAGNOSIS — D69.6 THROMBOCYTOPENIA (CMS-HCC): ICD-10-CM

## 2025-04-02 DIAGNOSIS — E66.01 MORBID OBESITY (MULTI): ICD-10-CM

## 2025-04-02 DIAGNOSIS — M25.512 CHRONIC PAIN OF BOTH SHOULDERS: ICD-10-CM

## 2025-04-02 DIAGNOSIS — I25.10 CORONARY ARTERY DISEASE INVOLVING NATIVE HEART WITHOUT ANGINA PECTORIS, UNSPECIFIED VESSEL OR LESION TYPE: ICD-10-CM

## 2025-04-02 DIAGNOSIS — I48.11 LONGSTANDING PERSISTENT ATRIAL FIBRILLATION (MULTI): ICD-10-CM

## 2025-04-02 DIAGNOSIS — G89.29 CHRONIC PAIN OF BOTH SHOULDERS: ICD-10-CM

## 2025-04-02 DIAGNOSIS — Z01.818 PRE-OPERATIVE CLEARANCE: Primary | ICD-10-CM

## 2025-04-02 DIAGNOSIS — I10 BENIGN HYPERTENSION: ICD-10-CM

## 2025-04-02 PROBLEM — N52.9 ERECTILE DYSFUNCTION: Status: RESOLVED | Noted: 2024-02-07 | Resolved: 2025-04-02

## 2025-04-02 PROBLEM — R39.9 URINARY SYMPTOM OR SIGN: Status: RESOLVED | Noted: 2024-12-31 | Resolved: 2025-04-02

## 2025-04-02 PROBLEM — R60.0 EDEMA LEG: Status: RESOLVED | Noted: 2023-03-03 | Resolved: 2025-04-02

## 2025-04-02 PROBLEM — Z12.5 PROSTATE CANCER SCREENING: Status: RESOLVED | Noted: 2024-11-27 | Resolved: 2025-04-02

## 2025-04-02 PROCEDURE — 72050 X-RAY EXAM NECK SPINE 4/5VWS: CPT

## 2025-04-02 PROCEDURE — 3074F SYST BP LT 130 MM HG: CPT | Performed by: FAMILY MEDICINE

## 2025-04-02 PROCEDURE — 72050 X-RAY EXAM NECK SPINE 4/5VWS: CPT | Performed by: RADIOLOGY

## 2025-04-02 PROCEDURE — 1159F MED LIST DOCD IN RCRD: CPT | Performed by: FAMILY MEDICINE

## 2025-04-02 PROCEDURE — 3078F DIAST BP <80 MM HG: CPT | Performed by: FAMILY MEDICINE

## 2025-04-02 PROCEDURE — 1036F TOBACCO NON-USER: CPT | Performed by: FAMILY MEDICINE

## 2025-04-02 PROCEDURE — G2211 COMPLEX E/M VISIT ADD ON: HCPCS | Performed by: FAMILY MEDICINE

## 2025-04-02 PROCEDURE — 1160F RVW MEDS BY RX/DR IN RCRD: CPT | Performed by: FAMILY MEDICINE

## 2025-04-02 PROCEDURE — 99214 OFFICE O/P EST MOD 30 MIN: CPT | Performed by: FAMILY MEDICINE

## 2025-04-02 RX ORDER — METHYLPREDNISOLONE 4 MG/1
TABLET ORAL
Qty: 21 TABLET | Refills: 0 | Status: SHIPPED | OUTPATIENT
Start: 2025-04-02 | End: 2025-04-08

## 2025-04-02 ASSESSMENT — ENCOUNTER SYMPTOMS: CONSTIPATION: 1

## 2025-04-02 ASSESSMENT — PATIENT HEALTH QUESTIONNAIRE - PHQ9
1. LITTLE INTEREST OR PLEASURE IN DOING THINGS: SEVERAL DAYS
2. FEELING DOWN, DEPRESSED OR HOPELESS: SEVERAL DAYS
10. IF YOU CHECKED OFF ANY PROBLEMS, HOW DIFFICULT HAVE THESE PROBLEMS MADE IT FOR YOU TO DO YOUR WORK, TAKE CARE OF THINGS AT HOME, OR GET ALONG WITH OTHER PEOPLE: NOT DIFFICULT AT ALL
SUM OF ALL RESPONSES TO PHQ9 QUESTIONS 1 AND 2: 2

## 2025-04-02 NOTE — PROGRESS NOTES
"Subjective   Patient ID: Roddy Diana is a 79 y.o. male who presents for Surgical clearance; scheduled to undergo cataract removal with Dr. ELEANOR Hernandez. Left eye 4/7/25, right eye 4/14/25.    The patient is here for clearance for surgery.    The patient does not have a history of any significant cardiovascular disease, no history of significant lung disease.  The patient does not have a history of blood clots or bleeding disorders.  Patient does not have a history of significant reaction to anesthesia.    Upper shoulders and neck sore for a few months   On pain medication     Some burning on left hand       Atrial fibrillation, coronary artery disease:, chronic, on blood thinners, sees cardiology, Dr Oscar mack, no palpitations, chest pain,    Saw Dr Moore recently , doesn't have to stop warfarin      Major depressive disorder, mood ok  on venlafaxine  Some stress at home      Lumbar radiculopathy: Seeing pain management and getting injections     Hypothyroidism, stable energy level     BPH: On finasteride, urinating ok      Erratic bowels  ,  soft      Obstructive sleep apnea not on cpap after weight loss            Review of Systems   Gastrointestinal:  Positive for constipation.       Objective   /71   Pulse 73   Temp 36.6 °C (97.9 °F)   Ht 1.651 m (5' 5\")   Wt 120 kg (264 lb)   SpO2 93%   BMI 43.93 kg/m²     Physical Exam  Constitutional:       Appearance: Normal appearance. He is well-developed.   HENT:      Mouth/Throat:      Mouth: Mucous membranes are moist.   Neck:      Comments: Neck nontender     Shoulder with some tenderness upper trapezium  ROM shoulder intake bilaterally   Cardiovascular:      Rate and Rhythm: Normal rate and regular rhythm.      Heart sounds: Normal heart sounds. No murmur heard.     Comments:     Some lower extremity edema 1-2 + nonpitting   Pulmonary:      Effort: Pulmonary effort is normal.      Breath sounds: Normal breath sounds.   Abdominal:      General: Abdomen is flat. "      Palpations: Abdomen is soft.   Musculoskeletal:         General: Normal range of motion.   Skin:     General: Skin is warm.   Neurological:      General: No focal deficit present.      Mental Status: He is alert and oriented to person, place, and time.      Motor: No weakness.      Deep Tendon Reflexes: Reflexes normal.   Psychiatric:         Mood and Affect: Mood normal.         Behavior: Behavior normal.         Assessment/Plan   Problem List Items Addressed This Visit             ICD-10-CM    Atrial fibrillation (Multi) I48.91    Benign hypertension I10    CAD (coronary artery disease) I25.10    Major depression, recurrent F33.9    Morbid obesity (Multi) E66.01    Obstructive sleep apnea G47.33     Other Visit Diagnoses         Codes    Pre-operative clearance    -  Primary Z01.818    Neck pain     M54.2    Relevant Medications    methylPREDNISolone (Medrol Dospak) 4 mg tablets    Chronic pain of both shoulders     M25.511, G89.29, M25.512    Relevant Medications    methylPREDNISolone (Medrol Dospak) 4 mg tablets    Thrombocytopenia (CMS-HCC)     D69.6

## 2025-04-02 NOTE — PATIENT INSTRUCTIONS
The patient has stable medical conditions.  They are not at increased risk for cardiovascular complications.  Medically they are stable to proceed with planned surgery.      Medrol dose pack wait a couple weeks after surgery and eye healed     Heating pads     You have osteoarthritis which is the wear and tear arthritis that we see as people age.  Most people get arthritis as they get older.  Typically we see this arthritis more substantially in the larger joints in the body such as hips, knees, back, shoulders.  Exercising can help with arthritic pain.  It is good to keep your weight down.  This type of arthritis is not reversible.  There are things you can take to treat the symptoms.  Sometimes some over-the-counter joint supplements like glucosamine, chondroitin, Osteo Bi-Flex can help.  Turmeric over-the-counter can help with inflammation    Tylenol can help somewhat with the pain also.  Some people get benefit from topical medication such as lidocaine or Voltaren gel  which are both over-the-counter.  Also topical medications can help with pain ; such as topical Lidocaine or topical Voltaren      Exercise     Range of motion with neck and shoulder s

## 2025-04-04 ENCOUNTER — TELEPHONE (OUTPATIENT)
Dept: PRIMARY CARE | Facility: CLINIC | Age: 80
End: 2025-04-04
Payer: MEDICARE

## 2025-04-04 NOTE — TELEPHONE ENCOUNTER
----- Message from Ruth Lowery sent at 4/4/2025  8:59 AM EDT -----  Moderate disc disease in the spine likely causing pinched nerve into the hands, take the steroids as directed, let me know if symptoms not improving   Patient/Caregiver provided printed discharge information.

## 2025-05-19 ENCOUNTER — OFFICE VISIT (OUTPATIENT)
Dept: PAIN MEDICINE | Facility: CLINIC | Age: 80
End: 2025-05-19
Payer: MEDICARE

## 2025-05-19 VITALS
HEART RATE: 54 BPM | OXYGEN SATURATION: 97 % | SYSTOLIC BLOOD PRESSURE: 113 MMHG | DIASTOLIC BLOOD PRESSURE: 73 MMHG | RESPIRATION RATE: 16 BRPM

## 2025-05-19 DIAGNOSIS — M47.816 SPONDYLOSIS OF LUMBAR REGION WITHOUT MYELOPATHY OR RADICULOPATHY: ICD-10-CM

## 2025-05-19 DIAGNOSIS — M54.16 BILATERAL LUMBAR RADICULOPATHY: ICD-10-CM

## 2025-05-19 DIAGNOSIS — M15.9 GENERALIZED OSTEOARTHRITIS OF MULTIPLE SITES: ICD-10-CM

## 2025-05-19 DIAGNOSIS — Z79.899 ENCOUNTER FOR LONG-TERM (CURRENT) USE OF HIGH-RISK MEDICATION: Primary | ICD-10-CM

## 2025-05-19 PROCEDURE — G2211 COMPLEX E/M VISIT ADD ON: HCPCS | Performed by: NURSE PRACTITIONER

## 2025-05-19 PROCEDURE — 1125F AMNT PAIN NOTED PAIN PRSNT: CPT | Performed by: NURSE PRACTITIONER

## 2025-05-19 PROCEDURE — 1036F TOBACCO NON-USER: CPT | Performed by: NURSE PRACTITIONER

## 2025-05-19 PROCEDURE — 99214 OFFICE O/P EST MOD 30 MIN: CPT | Performed by: NURSE PRACTITIONER

## 2025-05-19 PROCEDURE — 1160F RVW MEDS BY RX/DR IN RCRD: CPT | Performed by: NURSE PRACTITIONER

## 2025-05-19 PROCEDURE — 1159F MED LIST DOCD IN RCRD: CPT | Performed by: NURSE PRACTITIONER

## 2025-05-19 PROCEDURE — 3078F DIAST BP <80 MM HG: CPT | Performed by: NURSE PRACTITIONER

## 2025-05-19 PROCEDURE — 3074F SYST BP LT 130 MM HG: CPT | Performed by: NURSE PRACTITIONER

## 2025-05-19 RX ORDER — HYDROCODONE BITARTRATE AND ACETAMINOPHEN 7.5; 325 MG/1; MG/1
1 TABLET ORAL 2 TIMES DAILY PRN
Qty: 42 TABLET | Refills: 0 | Status: CANCELLED | OUTPATIENT
Start: 2025-05-19 | End: 2025-06-16

## 2025-05-19 RX ORDER — HYDROCODONE BITARTRATE AND ACETAMINOPHEN 7.5; 325 MG/1; MG/1
1 TABLET ORAL SEE ADMIN INSTRUCTIONS
Qty: 42 TABLET | Refills: 0 | Status: SHIPPED | OUTPATIENT
Start: 2025-06-16 | End: 2025-07-14

## 2025-05-19 RX ORDER — HYDROCODONE BITARTRATE AND ACETAMINOPHEN 7.5; 325 MG/1; MG/1
1 TABLET ORAL SEE ADMIN INSTRUCTIONS
Qty: 42 TABLET | Refills: 0 | Status: SHIPPED | OUTPATIENT
Start: 2025-07-14 | End: 2025-08-11

## 2025-05-19 RX ORDER — HYDROCODONE BITARTRATE AND ACETAMINOPHEN 7.5; 325 MG/1; MG/1
1 TABLET ORAL SEE ADMIN INSTRUCTIONS
Qty: 42 TABLET | Refills: 0 | Status: SHIPPED | OUTPATIENT
Start: 2025-05-19 | End: 2025-06-16

## 2025-05-19 ASSESSMENT — ENCOUNTER SYMPTOMS
DEPRESSION: 1
LOSS OF SENSATION IN FEET: 0
OCCASIONAL FEELINGS OF UNSTEADINESS: 1

## 2025-05-19 ASSESSMENT — PAIN SCALES - GENERAL: PAINLEVEL_OUTOF10: 4
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
## 2025-06-15 DIAGNOSIS — E03.9 ADULT HYPOTHYROIDISM: ICD-10-CM

## 2025-06-16 RX ORDER — LEVOTHYROXINE SODIUM 75 UG/1
75 TABLET ORAL DAILY
Qty: 90 TABLET | Refills: 0 | Status: SHIPPED | OUTPATIENT
Start: 2025-06-16 | End: 2025-06-17

## 2025-06-17 DIAGNOSIS — E03.9 ADULT HYPOTHYROIDISM: ICD-10-CM

## 2025-06-17 RX ORDER — LEVOTHYROXINE SODIUM 75 UG/1
75 TABLET ORAL DAILY
Qty: 90 TABLET | Refills: 0 | Status: SHIPPED | OUTPATIENT
Start: 2025-06-17

## 2025-06-30 PROBLEM — I48.0 PAROXYSMAL ATRIAL FIBRILLATION (MULTI): Status: ACTIVE | Noted: 2022-07-27

## 2025-07-01 ENCOUNTER — APPOINTMENT (OUTPATIENT)
Dept: UROLOGY | Facility: CLINIC | Age: 80
End: 2025-07-01
Payer: MEDICARE

## 2025-07-02 ENCOUNTER — OFFICE VISIT (OUTPATIENT)
Dept: PRIMARY CARE | Facility: CLINIC | Age: 80
End: 2025-07-02
Payer: MEDICARE

## 2025-07-02 VITALS
SYSTOLIC BLOOD PRESSURE: 100 MMHG | WEIGHT: 268 LBS | BODY MASS INDEX: 44.65 KG/M2 | HEIGHT: 65 IN | DIASTOLIC BLOOD PRESSURE: 68 MMHG | OXYGEN SATURATION: 95 % | HEART RATE: 73 BPM | TEMPERATURE: 98.1 F

## 2025-07-02 DIAGNOSIS — I48.11 LONGSTANDING PERSISTENT ATRIAL FIBRILLATION (MULTI): ICD-10-CM

## 2025-07-02 DIAGNOSIS — N30.90 CYSTITIS: Primary | ICD-10-CM

## 2025-07-02 LAB
POC APPEARANCE, URINE: ABNORMAL
POC BILIRUBIN, URINE: NEGATIVE
POC BLOOD, URINE: ABNORMAL
POC COLOR, URINE: ABNORMAL
POC GLUCOSE, URINE: NEGATIVE MG/DL
POC KETONES, URINE: NEGATIVE MG/DL
POC LEUKOCYTES, URINE: ABNORMAL
POC NITRITE,URINE: POSITIVE
POC PH, URINE: 5.5 PH
POC PROTEIN, URINE: ABNORMAL MG/DL
POC SPECIFIC GRAVITY, URINE: >=1.03
POC UROBILINOGEN, URINE: 0.2 EU/DL

## 2025-07-02 PROCEDURE — 1159F MED LIST DOCD IN RCRD: CPT | Performed by: FAMILY MEDICINE

## 2025-07-02 PROCEDURE — G2211 COMPLEX E/M VISIT ADD ON: HCPCS | Performed by: FAMILY MEDICINE

## 2025-07-02 PROCEDURE — 3074F SYST BP LT 130 MM HG: CPT | Performed by: FAMILY MEDICINE

## 2025-07-02 PROCEDURE — 3078F DIAST BP <80 MM HG: CPT | Performed by: FAMILY MEDICINE

## 2025-07-02 PROCEDURE — 1160F RVW MEDS BY RX/DR IN RCRD: CPT | Performed by: FAMILY MEDICINE

## 2025-07-02 PROCEDURE — 1036F TOBACCO NON-USER: CPT | Performed by: FAMILY MEDICINE

## 2025-07-02 PROCEDURE — 99213 OFFICE O/P EST LOW 20 MIN: CPT | Performed by: FAMILY MEDICINE

## 2025-07-02 PROCEDURE — 81003 URINALYSIS AUTO W/O SCOPE: CPT | Performed by: FAMILY MEDICINE

## 2025-07-02 RX ORDER — CEPHALEXIN 500 MG/1
500 CAPSULE ORAL 3 TIMES DAILY
Qty: 30 CAPSULE | Refills: 0 | Status: SHIPPED | OUTPATIENT
Start: 2025-07-02 | End: 2025-07-12

## 2025-07-02 ASSESSMENT — ENCOUNTER SYMPTOMS
BACK PAIN: 0
FEVER: 0

## 2025-07-02 ASSESSMENT — PATIENT HEALTH QUESTIONNAIRE - PHQ9
1. LITTLE INTEREST OR PLEASURE IN DOING THINGS: NOT AT ALL
SUM OF ALL RESPONSES TO PHQ9 QUESTIONS 1 AND 2: 0
2. FEELING DOWN, DEPRESSED OR HOPELESS: NOT AT ALL

## 2025-07-02 NOTE — PROGRESS NOTES
"Subjective   Patient ID: Roddy Diana is a 79 y.o. male who presents for BLOOD IN URINE / BURNING AND INCONTINENCE WITH URINATION. Sx's onset last Friday. States he has not been to urology; has a family h/o prostate cancer.    Last 4 days      blood burning incomplete emptying       Sore neck and shoulders          Review of Systems   Constitutional:  Negative for fever.   Musculoskeletal:  Negative for back pain.       Objective   /68   Pulse 73   Temp 36.7 °C (98.1 °F)   Ht 1.651 m (5' 5\")   Wt 122 kg (268 lb)   SpO2 95%   BMI 44.60 kg/m²     Physical Exam  Constitutional:       Appearance: Normal appearance. He is well-developed.   Cardiovascular:      Rate and Rhythm: Normal rate and regular rhythm.      Heart sounds: Normal heart sounds. No murmur heard.  Pulmonary:      Effort: Pulmonary effort is normal.      Breath sounds: Normal breath sounds.   Abdominal:      Tenderness: There is no right CVA tenderness or left CVA tenderness.   Neurological:      General: No focal deficit present.      Mental Status: He is alert.   Psychiatric:         Mood and Affect: Mood normal.         Assessment/Plan   Problem List Items Addressed This Visit           ICD-10-CM    Atrial fibrillation (Multi) I48.91     Other Visit Diagnoses         Codes      Cystitis    -  Primary N30.90    Relevant Medications    cephalexin (Keflex) 500 mg capsule    Other Relevant Orders    Urine Culture    POCT UA Automated manually resulted (Completed)               "

## 2025-07-05 LAB — BACTERIA UR CULT: ABNORMAL

## 2025-07-07 ENCOUNTER — TELEPHONE (OUTPATIENT)
Dept: PRIMARY CARE | Facility: CLINIC | Age: 80
End: 2025-07-07
Payer: MEDICARE

## 2025-07-07 NOTE — TELEPHONE ENCOUNTER
----- Message from Chirstian Junior sent at 7/6/2025 10:15 PM EDT -----  Uti tx'd  ----- Message -----  From: Cony Parmar LPN  Sent: 7/2/2025   3:16 PM EDT  To: Ruth Lowery MD

## 2025-07-07 NOTE — TELEPHONE ENCOUNTER
Result Communication    Resulted Orders   POCT UA Automated manually resulted   Result Value Ref Range    POC Color, Urine Other (A) Straw, Yellow, Light-Yellow    POC Appearance, Urine Hazy (A) Clear    POC Glucose, Urine NEGATIVE NEGATIVE mg/dl    POC Bilirubin, Urine NEGATIVE NEGATIVE    POC Ketones, Urine NEGATIVE NEGATIVE mg/dl    POC Specific Gravity, Urine >=1.030 1.005 - 1.035    POC Blood, Urine MODERATE (2+) (A) NEGATIVE    POC PH, Urine 5.5 No Reference Range Established PH    POC Protein, Urine 30 (1+) (A) NEGATIVE mg/dl    POC Urobilinogen, Urine 0.2 0.2, 1.0 EU/DL    Poc Nitrite, Urine POSITIVE (A) NEGATIVE    POC Leukocytes, Urine MODERATE (2+) (A) NEGATIVE   Urine Culture   Result Value Ref Range    CULTURE, URINE, ROUTINE SEE NOTE (A)       Comment:          CULTURE, URINE, ROUTINE         Micro Number:      [id number]    Test Status:       Final    Specimen Source:   Urine, clean catch    Specimen Quality:  Adequate    Result:            50,000-100,000 CFU/mL of Escherichia coli                              E.coli                            ----------------                            INT   JUAN     AMOX/CLAVULANATE       S     <=2     AMP/SULBACTAM          S     <=2     CEFAZOLIN              NR    <=1 **2     CEFEPIME               S     <=0.12     CEFTAZIDIME            S     <=0.5     CEFTRIAXONE            S     <=0.25     CIPROFLOXACIN          S     <=0.06     GENTAMICIN             S     <=1     IMIPENEM               S     <=0.25     LEVOFLOXACIN           S     <=0.12     MEROPENEM              S     <=0.25     NITROFURANTOIN         S     <=16     PIP/TAZOBACTAM         S     <=4     TRIMETHOPRIM/SULFA     S     <=20    S = Susceptible  I = Intermediate  R = Resistant  NS = Not susceptible  SDD = Susceptible Dose Dependent  *  = Not Tested  NR = Not Reported  **NN = See Therapy Comments      THERAPY COMMENTS        Note 1:      For infections other than uncomplicated UTI      caused by E.  coli, K. pneumoniae or P. mirabilis:      Cefazolin is resistant if JUAN > or = 8 mcg/mL.      (Distinguishing susceptible versus intermediate      for isolates with JUAN < or = 4 mcg/mL requires      additional testing.)        Note 2:      For uncomplicated UTI caused by E. coli,      K. pneumoniae or P. mirabilis: Cefazolin is      susceptible if JUAN <32 mcg/mL and predicts      susceptible to the oral agents cefaclor, cefdinir,      cefpodoxime, cefprozil, cefuroxime, cephalexin      and loracarbef.         9:11 AM  Christian Junior MD  P  [place] [id number] Clinical Support Staff  Uti tx'd    Spoke with pt's wife.

## 2025-08-08 ENCOUNTER — OFFICE VISIT (OUTPATIENT)
Dept: PRIMARY CARE | Facility: CLINIC | Age: 80
End: 2025-08-08
Payer: MEDICARE

## 2025-08-08 ENCOUNTER — TELEPHONE (OUTPATIENT)
Dept: PRIMARY CARE | Facility: CLINIC | Age: 80
End: 2025-08-08

## 2025-08-08 VITALS
OXYGEN SATURATION: 96 % | SYSTOLIC BLOOD PRESSURE: 104 MMHG | HEART RATE: 60 BPM | DIASTOLIC BLOOD PRESSURE: 66 MMHG | TEMPERATURE: 98.1 F | HEIGHT: 69 IN | WEIGHT: 263 LBS | BODY MASS INDEX: 38.95 KG/M2

## 2025-08-08 DIAGNOSIS — N41.9 PROSTATITIS, UNSPECIFIED PROSTATITIS TYPE: ICD-10-CM

## 2025-08-08 DIAGNOSIS — N30.90 CYSTITIS: Primary | ICD-10-CM

## 2025-08-08 LAB
POC APPEARANCE, URINE: CLEAR
POC BILIRUBIN, URINE: NEGATIVE
POC BLOOD, URINE: ABNORMAL
POC COLOR, URINE: YELLOW
POC GLUCOSE, URINE: NEGATIVE MG/DL
POC KETONES, URINE: NEGATIVE MG/DL
POC LEUKOCYTES, URINE: ABNORMAL
POC NITRITE,URINE: POSITIVE
POC PH, URINE: 5.5 PH
POC PROTEIN, URINE: ABNORMAL MG/DL
POC SPECIFIC GRAVITY, URINE: 1.02
POC UROBILINOGEN, URINE: 0.2 EU/DL

## 2025-08-08 PROCEDURE — 3078F DIAST BP <80 MM HG: CPT | Performed by: FAMILY MEDICINE

## 2025-08-08 PROCEDURE — 3074F SYST BP LT 130 MM HG: CPT | Performed by: FAMILY MEDICINE

## 2025-08-08 PROCEDURE — 1159F MED LIST DOCD IN RCRD: CPT | Performed by: FAMILY MEDICINE

## 2025-08-08 PROCEDURE — 99213 OFFICE O/P EST LOW 20 MIN: CPT | Performed by: FAMILY MEDICINE

## 2025-08-08 PROCEDURE — G2211 COMPLEX E/M VISIT ADD ON: HCPCS | Performed by: FAMILY MEDICINE

## 2025-08-08 PROCEDURE — 81003 URINALYSIS AUTO W/O SCOPE: CPT | Performed by: FAMILY MEDICINE

## 2025-08-08 RX ORDER — SULFAMETHOXAZOLE AND TRIMETHOPRIM 800; 160 MG/1; MG/1
1 TABLET ORAL 2 TIMES DAILY
Qty: 42 TABLET | Refills: 0 | Status: SHIPPED | OUTPATIENT
Start: 2025-08-08 | End: 2025-08-29

## 2025-08-08 ASSESSMENT — ENCOUNTER SYMPTOMS: FEVER: 1

## 2025-08-08 NOTE — PATIENT INSTRUCTIONS
Recommend frequent fluid intake, you can take over-the-counter medications for symptom relief.  We will send urine for culture to confirm the infection and notify when results come back usually within 2-3 days.  Call if your symptoms worsen, especially fevers, worsening back pain, nausea, vomiting or diarrhea.     Recommend follow-up with urology for recurrent UTIs    We need to continue the antibiotic for 3 weeks     Check INR next week to make sure your dose is ok

## 2025-08-08 NOTE — TELEPHONE ENCOUNTER
Pharmacist Rudy - [facility] [phone]    Pharmacist Rudy states pt is taking Warfarin 5 mg - take one tab by mouth once a day in evening and was prescribed Bactrim -160 mg - take one tab twice a day for 21 days. Pharmacist Rudy states that is a hard stop because of the side effects from med interactions. Pharmacist Rudy states he wanted to make sure  is aware of both meds.

## 2025-08-08 NOTE — PROGRESS NOTES
"Subjective   Patient ID: Roddy Diana is a 79 y.o. male who presents for UTI. States he developed pain and burning upon urinating this Monday.    Urinary tract symptoms  Pain and burning with urination  No blood   Lack of control     Sxs resolved with last infection but then returned again     BPH has seen Dr Mckeon in past          Review of Systems   Constitutional:  Positive for fever.       Objective   /66   Pulse 60   Temp 36.7 °C (98.1 °F)   Ht 1.753 m (5' 9\")   Wt 119 kg (263 lb)   SpO2 96%   BMI 38.84 kg/m²     Physical Exam  Constitutional:       Appearance: Normal appearance. He is well-developed.     Cardiovascular:      Rate and Rhythm: Normal rate and regular rhythm.      Heart sounds: Normal heart sounds. No murmur heard.  Pulmonary:      Effort: Pulmonary effort is normal.      Breath sounds: Normal breath sounds.   Abdominal:      Tenderness: There is no right CVA tenderness or left CVA tenderness.     Neurological:      General: No focal deficit present.      Mental Status: He is alert.         Assessment/Plan   Problem List Items Addressed This Visit    None  Visit Diagnoses         Codes      Cystitis    -  Primary N30.90    Relevant Medications    sulfamethoxazole-trimethoprim (Bactrim DS) 800-160 mg tablet    Other Relevant Orders    POCT UA Automated manually resulted (Completed)    Urine Culture      Prostatitis, unspecified prostatitis type     N41.9    Relevant Medications    sulfamethoxazole-trimethoprim (Bactrim DS) 800-160 mg tablet    Other Relevant Orders    Urine Culture               "

## 2025-08-12 ENCOUNTER — OFFICE VISIT (OUTPATIENT)
Dept: PAIN MEDICINE | Facility: CLINIC | Age: 80
End: 2025-08-12
Payer: MEDICARE

## 2025-08-12 VITALS
DIASTOLIC BLOOD PRESSURE: 70 MMHG | RESPIRATION RATE: 16 BRPM | OXYGEN SATURATION: 95 % | SYSTOLIC BLOOD PRESSURE: 100 MMHG | HEART RATE: 78 BPM

## 2025-08-12 DIAGNOSIS — M15.9 GENERALIZED OSTEOARTHRITIS OF MULTIPLE SITES: ICD-10-CM

## 2025-08-12 DIAGNOSIS — M54.16 BILATERAL LUMBAR RADICULOPATHY: ICD-10-CM

## 2025-08-12 DIAGNOSIS — Z79.899 ENCOUNTER FOR LONG-TERM (CURRENT) USE OF HIGH-RISK MEDICATION: Primary | ICD-10-CM

## 2025-08-12 DIAGNOSIS — Z79.891 USE OF OPIATES FOR THERAPEUTIC PURPOSES: ICD-10-CM

## 2025-08-12 DIAGNOSIS — M47.816 SPONDYLOSIS OF LUMBAR REGION WITHOUT MYELOPATHY OR RADICULOPATHY: ICD-10-CM

## 2025-08-12 PROCEDURE — 1159F MED LIST DOCD IN RCRD: CPT | Performed by: NURSE PRACTITIONER

## 2025-08-12 PROCEDURE — G2211 COMPLEX E/M VISIT ADD ON: HCPCS | Performed by: NURSE PRACTITIONER

## 2025-08-12 PROCEDURE — 99214 OFFICE O/P EST MOD 30 MIN: CPT | Performed by: NURSE PRACTITIONER

## 2025-08-12 PROCEDURE — 3078F DIAST BP <80 MM HG: CPT | Performed by: NURSE PRACTITIONER

## 2025-08-12 PROCEDURE — 1160F RVW MEDS BY RX/DR IN RCRD: CPT | Performed by: NURSE PRACTITIONER

## 2025-08-12 PROCEDURE — 3074F SYST BP LT 130 MM HG: CPT | Performed by: NURSE PRACTITIONER

## 2025-08-12 PROCEDURE — 99212 OFFICE O/P EST SF 10 MIN: CPT | Performed by: NURSE PRACTITIONER

## 2025-08-12 PROCEDURE — 1125F AMNT PAIN NOTED PAIN PRSNT: CPT | Performed by: NURSE PRACTITIONER

## 2025-08-12 RX ORDER — NALOXONE HYDROCHLORIDE 4 MG/.1ML
1 SPRAY NASAL AS NEEDED
Qty: 2 EACH | Refills: 0 | Status: SHIPPED | OUTPATIENT
Start: 2025-08-12

## 2025-08-12 RX ORDER — HYDROCODONE BITARTRATE AND ACETAMINOPHEN 7.5; 325 MG/1; MG/1
1 TABLET ORAL SEE ADMIN INSTRUCTIONS
Qty: 42 TABLET | Refills: 0 | Status: SHIPPED | OUTPATIENT
Start: 2025-08-14 | End: 2025-09-11

## 2025-08-12 RX ORDER — HYDROCODONE BITARTRATE AND ACETAMINOPHEN 7.5; 325 MG/1; MG/1
1 TABLET ORAL SEE ADMIN INSTRUCTIONS
Qty: 42 TABLET | Refills: 0 | Status: SHIPPED | OUTPATIENT
Start: 2025-09-11 | End: 2025-10-09

## 2025-08-12 RX ORDER — HYDROCODONE BITARTRATE AND ACETAMINOPHEN 7.5; 325 MG/1; MG/1
1 TABLET ORAL SEE ADMIN INSTRUCTIONS
Qty: 42 TABLET | Refills: 0 | Status: SHIPPED | OUTPATIENT
Start: 2025-10-09 | End: 2025-11-06

## 2025-08-12 ASSESSMENT — ENCOUNTER SYMPTOMS
OCCASIONAL FEELINGS OF UNSTEADINESS: 1
LOSS OF SENSATION IN FEET: 0
DEPRESSION: 1

## 2025-08-12 ASSESSMENT — PAIN SCALES - GENERAL: PAINLEVEL_OUTOF10: 4

## 2025-08-12 ASSESSMENT — PAIN - FUNCTIONAL ASSESSMENT: PAIN_FUNCTIONAL_ASSESSMENT: 0-10

## 2025-08-12 ASSESSMENT — PAIN DESCRIPTION - DESCRIPTORS: DESCRIPTORS: SHARP;NAGGING;ACHING

## 2025-08-13 LAB — BACTERIA UR CULT: ABNORMAL

## 2025-08-15 DIAGNOSIS — K90.89 BILE SALT-INDUCED DIARRHEA (HHS-HCC): ICD-10-CM

## 2025-08-15 RX ORDER — DICYCLOMINE HYDROCHLORIDE 20 MG/1
TABLET ORAL
Qty: 90 TABLET | Refills: 2 | Status: SHIPPED | OUTPATIENT
Start: 2025-08-15

## 2025-08-18 LAB — BACTERIA UR CULT: ABNORMAL

## 2025-09-02 ENCOUNTER — APPOINTMENT (OUTPATIENT)
Dept: UROLOGY | Facility: CLINIC | Age: 80
End: 2025-09-02
Payer: MEDICARE

## 2025-09-02 ASSESSMENT — PAIN SCALES - GENERAL: PAINLEVEL_OUTOF10: 0-NO PAIN

## 2025-09-05 PROBLEM — N39.0 RECURRENT UTI: Status: ACTIVE | Noted: 2025-09-05

## 2025-09-05 PROBLEM — R39.15 URINARY URGENCY: Status: ACTIVE | Noted: 2025-09-05

## 2025-10-21 ENCOUNTER — APPOINTMENT (OUTPATIENT)
Dept: UROLOGY | Facility: CLINIC | Age: 80
End: 2025-10-21
Payer: MEDICARE

## 2025-11-04 ENCOUNTER — APPOINTMENT (OUTPATIENT)
Dept: PAIN MEDICINE | Facility: CLINIC | Age: 80
End: 2025-11-04
Payer: MEDICARE